# Patient Record
Sex: MALE | Race: WHITE | NOT HISPANIC OR LATINO
[De-identification: names, ages, dates, MRNs, and addresses within clinical notes are randomized per-mention and may not be internally consistent; named-entity substitution may affect disease eponyms.]

---

## 2021-04-05 PROBLEM — Z00.00 ENCOUNTER FOR PREVENTIVE HEALTH EXAMINATION: Status: ACTIVE | Noted: 2021-04-05

## 2021-04-06 ENCOUNTER — APPOINTMENT (OUTPATIENT)
Dept: UROLOGY | Facility: CLINIC | Age: 61
End: 2021-04-06
Payer: COMMERCIAL

## 2021-04-06 VITALS
HEART RATE: 76 BPM | RESPIRATION RATE: 17 BRPM | BODY MASS INDEX: 34.4 KG/M2 | SYSTOLIC BLOOD PRESSURE: 146 MMHG | DIASTOLIC BLOOD PRESSURE: 78 MMHG | WEIGHT: 227 LBS | TEMPERATURE: 98.1 F | HEIGHT: 68 IN

## 2021-04-06 DIAGNOSIS — Z78.9 OTHER SPECIFIED HEALTH STATUS: ICD-10-CM

## 2021-04-06 PROCEDURE — 99203 OFFICE O/P NEW LOW 30 MIN: CPT

## 2021-04-06 PROCEDURE — 99072 ADDL SUPL MATRL&STAF TM PHE: CPT

## 2021-04-06 NOTE — REVIEW OF SYSTEMS
[Negative] : Heme/Lymph [Loss of interest] : loss of interest in sexual activity [No erections] : no erections [Fever] : no fever [Eye Pain] : no eye pain [Earache] : no earache [Chest Pain] : no chest pain [Cough] : no cough [Abdominal Pain] : no abdominal pain [Dysuria] : no dysuria [Arthralgias] : no arthralgias [Skin Wound] : no skin wound [Confused] : no confusion [Suicidal] : not suicidal [Proptosis] : no proptosis [Easy Bleeding] : no tendency for easy bleeding

## 2021-04-06 NOTE — REVIEW OF SYSTEMS
[Negative] : Heme/Lymph [Loss of interest] : loss of interest in sexual activity [No erections] : no erections [Fever] : no fever [Eye Pain] : no eye pain [Earache] : no earache [Chest Pain] : no chest pain [Cough] : no cough [Abdominal Pain] : no abdominal pain [Dysuria] : no dysuria [Arthralgias] : no arthralgias [Confused] : no confusion [Skin Wound] : no skin wound [Suicidal] : not suicidal [Proptosis] : no proptosis [Easy Bleeding] : no tendency for easy bleeding

## 2021-04-06 NOTE — HISTORY OF PRESENT ILLNESS
[FreeTextEntry1] : 60 yr male sent by PMD  Lionel Fletcher for PSA elevation 4.36 .  Has not been to urology in about 5 year. No LUTS as detailed below. .  No blood in urine.  \par Noted PSA 4.4 2019 on review of HIE. no prior PNb \par  [Urinary Incontinence] : no urinary incontinence [Urinary Retention] : no urinary retention [Urinary Urgency] : no urinary urgency [Urinary Frequency] : no urinary frequency [Nocturia] : no nocturia [Straining] : no straining [Weak Stream] : no weak stream [Erectile Dysfunction] : no Erectile Dysfunction [Hematuria - Gross] : no gross hematuria

## 2021-04-06 NOTE — ASSESSMENT
[FreeTextEntry1] : 60 yr male present PSA elevation  - onlky slightly elevated and stable fro past 2 years.\par will check a free and totla PSA - only pursue further work up if has %free PSA ,10%

## 2021-04-06 NOTE — PHYSICAL EXAM
[General Appearance - Well Developed] : well developed [General Appearance - Well Nourished] : well nourished [Normal Appearance] : normal appearance [Well Groomed] : well groomed [General Appearance - In No Acute Distress] : no acute distress [Edema] : no peripheral edema [Abdomen Tenderness] : non-tender [Skin Color & Pigmentation] : normal skin color and pigmentation [No Focal Deficits] : no focal deficits [Oriented To Time, Place, And Person] : oriented to person, place, and time [Abdomen Mass (___ Cm)] : no abdominal mass palpated [Abdomen Hernia] : no hernia was discovered [Size ___ (gms)] : size was estimated to be [unfilled] g [Prostate Hard Area Or Nodule Bilaterally] : had no palpable nodules [Rectal Exam - Prostate] : was not indurated [Nl Sphincter Tone] : normal sphincter tone [No Lesions] : no lesions [Meatal Stenosis] : no meatal stenosis [Circumcised] : the penis was circumcised [Normal] : normal [Scrotum Hydrocele On The Right] : no hydrocele [Scrotum Hydrocele On The Left] : no hydrocele [Testes] : normal [Epididymis] : was normal [Vas Deferens / Spermatic Cord] : was normal [Normal Station and Gait] : the gait and station were normal for the patient's age [] : no rash [Inguinal Lymph Nodes Enlarged Bilaterally] : inguinal

## 2021-04-07 LAB
APPEARANCE: CLEAR
BACTERIA: NEGATIVE
BILIRUBIN URINE: NEGATIVE
BLOOD URINE: NEGATIVE
COLOR: NORMAL
GLUCOSE QUALITATIVE U: NEGATIVE
HYALINE CASTS: 0 /LPF
KETONES URINE: NEGATIVE
LEUKOCYTE ESTERASE URINE: NEGATIVE
MICROSCOPIC-UA: NORMAL
NITRITE URINE: NEGATIVE
PH URINE: 6.5
PROTEIN URINE: NEGATIVE
PSA FREE FLD-MCNC: 24 %
PSA FREE SERPL-MCNC: 1.09 NG/ML
PSA SERPL-MCNC: 4.55 NG/ML
RED BLOOD CELLS URINE: 0 /HPF
SPECIFIC GRAVITY URINE: 1
SQUAMOUS EPITHELIAL CELLS: 0 /HPF
UROBILINOGEN URINE: NORMAL
WHITE BLOOD CELLS URINE: 0 /HPF

## 2022-04-19 ENCOUNTER — APPOINTMENT (OUTPATIENT)
Dept: UROLOGY | Facility: CLINIC | Age: 62
End: 2022-04-19
Payer: COMMERCIAL

## 2022-04-19 VITALS
DIASTOLIC BLOOD PRESSURE: 84 MMHG | BODY MASS INDEX: 31.84 KG/M2 | HEIGHT: 69 IN | OXYGEN SATURATION: 97 % | TEMPERATURE: 98.5 F | SYSTOLIC BLOOD PRESSURE: 148 MMHG | HEART RATE: 68 BPM | WEIGHT: 215 LBS

## 2022-04-19 DIAGNOSIS — Z63.5 DISRUPTION OF FAMILY BY SEPARATION AND DIVORCE: ICD-10-CM

## 2022-04-19 DIAGNOSIS — Z80.42 FAMILY HISTORY OF MALIGNANT NEOPLASM OF PROSTATE: ICD-10-CM

## 2022-04-19 DIAGNOSIS — Z80.9 FAMILY HISTORY OF MALIGNANT NEOPLASM, UNSPECIFIED: ICD-10-CM

## 2022-04-19 DIAGNOSIS — N40.1 BENIGN PROSTATIC HYPERPLASIA WITH LOWER URINARY TRACT SYMPMS: ICD-10-CM

## 2022-04-19 PROCEDURE — 99215 OFFICE O/P EST HI 40 MIN: CPT

## 2022-04-19 SDOH — SOCIAL STABILITY - SOCIAL INSECURITY: DISRUPTION OF FAMILY BY SEPARATION AND DIVORCE: Z63.5

## 2022-04-19 NOTE — PHYSICAL EXAM
[General Appearance - Well Developed] : well developed [General Appearance - Well Nourished] : well nourished [Normal Appearance] : normal appearance [Well Groomed] : well groomed [General Appearance - In No Acute Distress] : no acute distress [Edema] : no peripheral edema [Respiration, Rhythm And Depth] : normal respiratory rhythm and effort [Exaggerated Use Of Accessory Muscles For Inspiration] : no accessory muscle use [Abdomen Soft] : soft [Abdomen Tenderness] : non-tender [Costovertebral Angle Tenderness] : no ~M costovertebral angle tenderness [Urethral Meatus] : meatus normal [Penis Abnormality] : normal circumcised penis [Urinary Bladder Findings] : the bladder was normal on palpation [Scrotum] : the scrotum was normal [Epididymis] : the epididymides were normal [Testes Tenderness] : no tenderness of the testes [Testes Mass (___cm)] : there were no testicular masses [Anus Abnormality] : the anus and perineum were normal [Prostate Tenderness] : the prostate was not tender [Rectal Exam - Rectum] : digital rectal exam was normal [No Prostate Nodules] : no prostate nodules [Prostate Size ___ (0-4)] : prostate size [unfilled] (scale: 0-4) [Normal Station and Gait] : the gait and station were normal for the patient's age [] : no rash [No Focal Deficits] : no focal deficits [Oriented To Time, Place, And Person] : oriented to person, place, and time [Affect] : the affect was normal [Mood] : the mood was normal [Not Anxious] : not anxious [No Palpable Adenopathy] : no palpable adenopathy

## 2022-04-20 PROBLEM — N40.1 ENLARGED PROSTATE WITH LOWER URINARY TRACT SYMPTOMS (LUTS): Status: ACTIVE | Noted: 2022-04-20

## 2022-04-20 LAB — PSA SERPL-MCNC: 10.2 NG/ML

## 2022-04-20 NOTE — LETTER BODY
[Dear  ___] : Dear  [unfilled], [Consult Letter:] : I had the pleasure of evaluating your patient, [unfilled]. [Please see my note below.] : Please see my note below. [Consult Closing:] : Thank you very much for allowing me to participate in the care of this patient.  If you have any questions, please do not hesitate to contact me. [Sincerely,] : Sincerely, [FreeTextEntry3] : Abelino Delgado MD\par

## 2022-04-20 NOTE — HISTORY OF PRESENT ILLNESS
[FreeTextEntry1] : Sent by Dr Fletcher for  rising PSA \par \par 04/01/2022: 7.40 ng/ml\par 04/07/2021: 4.55 ng/ml\par                      4.36 ng/ml\par \par no urinary symptoms. As per Dr Bermudez's note PSA was stable for last 2 years in 04/2021.\par Will repeat PSA today. \par \par ED: , Good libido, gets erections are not hard enough.

## 2022-04-20 NOTE — REVIEW OF SYSTEMS
[Poor quality erections] : Poor quality erections [Wake up at night to urinate  How many times?  ___] : wakes up to urinate [unfilled] times during the night [Negative] : Heme/Lymph [see HPI] : see HPI [FreeTextEntry1] : not enough rest  [FreeTextEntry4] : interested in treatment

## 2022-05-26 ENCOUNTER — APPOINTMENT (OUTPATIENT)
Dept: UROLOGY | Facility: CLINIC | Age: 62
End: 2022-05-26
Payer: COMMERCIAL

## 2022-05-26 VITALS
TEMPERATURE: 98.1 F | SYSTOLIC BLOOD PRESSURE: 132 MMHG | WEIGHT: 220 LBS | HEART RATE: 63 BPM | HEIGHT: 69 IN | BODY MASS INDEX: 32.58 KG/M2 | DIASTOLIC BLOOD PRESSURE: 81 MMHG | OXYGEN SATURATION: 98 %

## 2022-05-26 PROCEDURE — 99213 OFFICE O/P EST LOW 20 MIN: CPT

## 2022-05-26 RX ORDER — ATORVASTATIN CALCIUM 20 MG/1
20 TABLET, FILM COATED ORAL
Refills: 0 | Status: ACTIVE | COMMUNITY

## 2022-05-26 RX ORDER — CHROMIUM 200 MCG
TABLET ORAL
Refills: 0 | Status: ACTIVE | COMMUNITY

## 2022-05-26 NOTE — REVIEW OF SYSTEMS
[see HPI] : see HPI [Poor quality erections] : Poor quality erections [Wake up at night to urinate  How many times?  ___] : wakes up to urinate [unfilled] times during the night [Negative] : Heme/Lymph [FreeTextEntry1] : not enough rest  [FreeTextEntry4] : interested in treatment

## 2022-05-26 NOTE — HISTORY OF PRESENT ILLNESS
[FreeTextEntry1] : Sent by Dr Fletcher for  rising PSA \par                   4.36 ng/ml\par 04/07/2021: 4.55 ng/ml\par 04/01/2022: 7.40 ng/ml\par \par no urinary symptoms. As per Dr Bermudez's note PSA was stable for last 2 years in 04/2021.\par Will repeat PSA today. \par \par ED: , Good libido, gets erections are not hard enough. \par \par will repeat PSA \par RTC: 1 month\par \par \par 05/26/2022: Rising PSA\par \par                   4.36 ng/ml\par 04/07/2021: 4.55 ng/ml\par 04/01/2022: 7.40 ng/ml\par 04/19/2022: 10.20 ng/ml\par \par CISCO: non nodular prostate. \par Will schedule for MRI of Prostate and repeat PSA \par \par  ED: better with 20 mg. Will increase to 2 tabs\par \par RTC 3 weeks to review MRI finding.\par \par

## 2022-05-31 LAB — PSA SERPL-MCNC: 6.17 NG/ML

## 2022-06-07 ENCOUNTER — OUTPATIENT (OUTPATIENT)
Dept: OUTPATIENT SERVICES | Facility: HOSPITAL | Age: 62
LOS: 1 days | End: 2022-06-07
Payer: COMMERCIAL

## 2022-06-07 ENCOUNTER — APPOINTMENT (OUTPATIENT)
Dept: MRI IMAGING | Facility: IMAGING CENTER | Age: 62
End: 2022-06-07
Payer: COMMERCIAL

## 2022-06-07 DIAGNOSIS — R97.20 ELEVATED PROSTATE SPECIFIC ANTIGEN [PSA]: ICD-10-CM

## 2022-06-07 PROCEDURE — A9585: CPT

## 2022-06-07 PROCEDURE — 76498 UNLISTED MR PROCEDURE: CPT

## 2022-06-07 PROCEDURE — 72197 MRI PELVIS W/O & W/DYE: CPT

## 2022-06-07 PROCEDURE — 72197 MRI PELVIS W/O & W/DYE: CPT | Mod: 26

## 2022-06-10 ENCOUNTER — RESULT REVIEW (OUTPATIENT)
Age: 62
End: 2022-06-10

## 2022-06-10 PROCEDURE — 76498P: CUSTOM | Mod: 26

## 2022-06-16 ENCOUNTER — APPOINTMENT (OUTPATIENT)
Dept: UROLOGY | Facility: CLINIC | Age: 62
End: 2022-06-16
Payer: COMMERCIAL

## 2022-06-16 VITALS
DIASTOLIC BLOOD PRESSURE: 77 MMHG | TEMPERATURE: 98 F | OXYGEN SATURATION: 98 % | BODY MASS INDEX: 32.88 KG/M2 | HEART RATE: 69 BPM | WEIGHT: 222 LBS | HEIGHT: 69 IN | SYSTOLIC BLOOD PRESSURE: 128 MMHG

## 2022-06-16 DIAGNOSIS — E78.5 HYPERLIPIDEMIA, UNSPECIFIED: ICD-10-CM

## 2022-06-16 PROCEDURE — 99214 OFFICE O/P EST MOD 30 MIN: CPT

## 2022-06-16 NOTE — HISTORY OF PRESENT ILLNESS
[FreeTextEntry1] : Sent by Dr Fletcher for  rising PSA \par                   4.36 ng/ml\par 04/07/2021: 4.55 ng/ml\par 04/01/2022: 7.40 ng/ml\par \par no urinary symptoms. As per Dr Bermudez's note PSA was stable for last 2 years in 04/2021.\par Will repeat PSA today. \par \par ED: , Good libido, gets erections are not hard enough. \par \par will repeat PSA \par RTC: 1 month\par \par \par 05/26/2022: Rising PSA\par \par                   4.36 ng/ml\par 04/07/2021: 4.55 ng/ml\par 04/01/2022: 7.40 ng/ml\par 04/19/2022: 10.20 ng/ml\par \par CISCO: non nodular prostate. \par Will schedule for MRI of Prostate and repeat PSA \par \par  ED: better with 20 mg. Will increase to 2 tabs\par \par RTC 3 weeks to review MRI finding.\par \par 06/16/2022: Mr. AGUILAR is a 61 year male who presents today for a follow up for rising PSA and MRI results. \par 04/07/2021: 4.55 ng/ml\par 04/01/2022: 7.40 ng/ml\par 04/19/2022: 10.20 ng/ml\par 05/26/2022: 6.17 ng/ml \par \par Will repeat PSA today. \par \par MRI shows lesion in PIRADS 5 lesion.\par \par ED: Sildenafil working well. Renewed today\par \par will schedule for fusion biopsy with Dr Terry\par \par

## 2022-06-18 LAB — PSA SERPL-MCNC: 6.39 NG/ML

## 2022-06-28 ENCOUNTER — NON-APPOINTMENT (OUTPATIENT)
Age: 62
End: 2022-06-28

## 2022-07-01 DIAGNOSIS — Z01.818 ENCOUNTER FOR OTHER PREPROCEDURAL EXAMINATION: ICD-10-CM

## 2022-07-06 ENCOUNTER — APPOINTMENT (OUTPATIENT)
Dept: UROLOGY | Facility: CLINIC | Age: 62
End: 2022-07-06

## 2022-07-06 ENCOUNTER — OUTPATIENT (OUTPATIENT)
Dept: OUTPATIENT SERVICES | Facility: HOSPITAL | Age: 62
LOS: 1 days | End: 2022-07-06
Payer: COMMERCIAL

## 2022-07-06 VITALS
DIASTOLIC BLOOD PRESSURE: 77 MMHG | SYSTOLIC BLOOD PRESSURE: 144 MMHG | RESPIRATION RATE: 17 BRPM | TEMPERATURE: 98.5 F | OXYGEN SATURATION: 99 % | HEART RATE: 72 BPM

## 2022-07-06 DIAGNOSIS — R97.20 ELEVATED PROSTATE, SPECIFIC ANTIGEN [PSA]: ICD-10-CM

## 2022-07-06 DIAGNOSIS — R35.0 FREQUENCY OF MICTURITION: ICD-10-CM

## 2022-07-06 PROCEDURE — 55700: CPT

## 2022-07-06 PROCEDURE — 76872 US TRANSRECTAL: CPT

## 2022-07-06 PROCEDURE — 55700: CPT | Mod: 22

## 2022-07-06 PROCEDURE — 76942 ECHO GUIDE FOR BIOPSY: CPT | Mod: 59

## 2022-07-06 PROCEDURE — 76942 ECHO GUIDE FOR BIOPSY: CPT | Mod: 26,59

## 2022-07-06 PROCEDURE — 76377 3D RENDER W/INTRP POSTPROCES: CPT | Mod: 26

## 2022-07-07 ENCOUNTER — NON-APPOINTMENT (OUTPATIENT)
Age: 62
End: 2022-07-07

## 2022-07-08 ENCOUNTER — NON-APPOINTMENT (OUTPATIENT)
Age: 62
End: 2022-07-08

## 2022-07-09 ENCOUNTER — EMERGENCY (EMERGENCY)
Facility: HOSPITAL | Age: 62
LOS: 1 days | Discharge: HOME | End: 2022-07-09
Attending: EMERGENCY MEDICINE | Admitting: EMERGENCY MEDICINE
Payer: COMMERCIAL

## 2022-07-09 VITALS
TEMPERATURE: 98 F | OXYGEN SATURATION: 97 % | SYSTOLIC BLOOD PRESSURE: 150 MMHG | WEIGHT: 222.01 LBS | HEART RATE: 88 BPM | HEIGHT: 69 IN | DIASTOLIC BLOOD PRESSURE: 93 MMHG | RESPIRATION RATE: 17 BRPM

## 2022-07-09 VITALS — TEMPERATURE: 99 F

## 2022-07-09 LAB
ALBUMIN SERPL ELPH-MCNC: 4.5 G/DL — SIGNIFICANT CHANGE UP (ref 3.3–5)
ALP SERPL-CCNC: 70 U/L — SIGNIFICANT CHANGE UP (ref 40–120)
ALT FLD-CCNC: 20 U/L — SIGNIFICANT CHANGE UP (ref 10–45)
ANION GAP SERPL CALC-SCNC: 12 MMOL/L — SIGNIFICANT CHANGE UP (ref 5–17)
APPEARANCE UR: CLEAR — SIGNIFICANT CHANGE UP
AST SERPL-CCNC: 25 U/L — SIGNIFICANT CHANGE UP (ref 10–40)
BACTERIA # UR AUTO: NEGATIVE — SIGNIFICANT CHANGE UP
BASOPHILS # BLD AUTO: 0.03 K/UL — SIGNIFICANT CHANGE UP (ref 0–0.2)
BASOPHILS NFR BLD AUTO: 0.4 % — SIGNIFICANT CHANGE UP (ref 0–2)
BILIRUB SERPL-MCNC: 0.7 MG/DL — SIGNIFICANT CHANGE UP (ref 0.2–1.2)
BILIRUB UR-MCNC: NEGATIVE — SIGNIFICANT CHANGE UP
BUN SERPL-MCNC: 10 MG/DL — SIGNIFICANT CHANGE UP (ref 7–23)
CALCIUM SERPL-MCNC: 9.3 MG/DL — SIGNIFICANT CHANGE UP (ref 8.4–10.5)
CHLORIDE SERPL-SCNC: 102 MMOL/L — SIGNIFICANT CHANGE UP (ref 96–108)
CO2 SERPL-SCNC: 25 MMOL/L — SIGNIFICANT CHANGE UP (ref 22–31)
COLOR SPEC: COLORLESS — SIGNIFICANT CHANGE UP
CREAT SERPL-MCNC: 0.98 MG/DL — SIGNIFICANT CHANGE UP (ref 0.5–1.3)
DIFF PNL FLD: NEGATIVE — SIGNIFICANT CHANGE UP
EGFR: 88 ML/MIN/1.73M2 — SIGNIFICANT CHANGE UP
EOSINOPHIL # BLD AUTO: 0.06 K/UL — SIGNIFICANT CHANGE UP (ref 0–0.5)
EOSINOPHIL NFR BLD AUTO: 0.7 % — SIGNIFICANT CHANGE UP (ref 0–6)
EPI CELLS # UR: 0 /HPF — SIGNIFICANT CHANGE UP
GLUCOSE SERPL-MCNC: 95 MG/DL — SIGNIFICANT CHANGE UP (ref 70–99)
GLUCOSE UR QL: NEGATIVE — SIGNIFICANT CHANGE UP
HCT VFR BLD CALC: 47.8 % — SIGNIFICANT CHANGE UP (ref 39–50)
HGB BLD-MCNC: 16.1 G/DL — SIGNIFICANT CHANGE UP (ref 13–17)
HYALINE CASTS # UR AUTO: 0 /LPF — SIGNIFICANT CHANGE UP (ref 0–2)
IMM GRANULOCYTES NFR BLD AUTO: 0.4 % — SIGNIFICANT CHANGE UP (ref 0–1.5)
KETONES UR-MCNC: NEGATIVE — SIGNIFICANT CHANGE UP
LEUKOCYTE ESTERASE UR-ACNC: NEGATIVE — SIGNIFICANT CHANGE UP
LYMPHOCYTES # BLD AUTO: 2.11 K/UL — SIGNIFICANT CHANGE UP (ref 1–3.3)
LYMPHOCYTES # BLD AUTO: 25 % — SIGNIFICANT CHANGE UP (ref 13–44)
MAGNESIUM SERPL-MCNC: 2 MG/DL — SIGNIFICANT CHANGE UP (ref 1.6–2.6)
MCHC RBC-ENTMCNC: 29.1 PG — SIGNIFICANT CHANGE UP (ref 27–34)
MCHC RBC-ENTMCNC: 33.7 GM/DL — SIGNIFICANT CHANGE UP (ref 32–36)
MCV RBC AUTO: 86.4 FL — SIGNIFICANT CHANGE UP (ref 80–100)
MONOCYTES # BLD AUTO: 0.75 K/UL — SIGNIFICANT CHANGE UP (ref 0–0.9)
MONOCYTES NFR BLD AUTO: 8.9 % — SIGNIFICANT CHANGE UP (ref 2–14)
NEUTROPHILS # BLD AUTO: 5.46 K/UL — SIGNIFICANT CHANGE UP (ref 1.8–7.4)
NEUTROPHILS NFR BLD AUTO: 64.6 % — SIGNIFICANT CHANGE UP (ref 43–77)
NITRITE UR-MCNC: NEGATIVE — SIGNIFICANT CHANGE UP
NRBC # BLD: 0 /100 WBCS — SIGNIFICANT CHANGE UP (ref 0–0)
PH UR: 6.5 — SIGNIFICANT CHANGE UP (ref 5–8)
PHOSPHATE SERPL-MCNC: 3.1 MG/DL — SIGNIFICANT CHANGE UP (ref 2.5–4.5)
PLATELET # BLD AUTO: 209 K/UL — SIGNIFICANT CHANGE UP (ref 150–400)
POTASSIUM SERPL-MCNC: 4.3 MMOL/L — SIGNIFICANT CHANGE UP (ref 3.5–5.3)
POTASSIUM SERPL-SCNC: 4.3 MMOL/L — SIGNIFICANT CHANGE UP (ref 3.5–5.3)
PROT SERPL-MCNC: 7.8 G/DL — SIGNIFICANT CHANGE UP (ref 6–8.3)
PROT UR-MCNC: NEGATIVE — SIGNIFICANT CHANGE UP
RBC # BLD: 5.53 M/UL — SIGNIFICANT CHANGE UP (ref 4.2–5.8)
RBC # FLD: 13.1 % — SIGNIFICANT CHANGE UP (ref 10.3–14.5)
RBC CASTS # UR COMP ASSIST: 0 /HPF — SIGNIFICANT CHANGE UP (ref 0–4)
SODIUM SERPL-SCNC: 139 MMOL/L — SIGNIFICANT CHANGE UP (ref 135–145)
SP GR SPEC: 1.01 — LOW (ref 1.01–1.02)
UROBILINOGEN FLD QL: NEGATIVE — SIGNIFICANT CHANGE UP
WBC # BLD: 8.44 K/UL — SIGNIFICANT CHANGE UP (ref 3.8–10.5)
WBC # FLD AUTO: 8.44 K/UL — SIGNIFICANT CHANGE UP (ref 3.8–10.5)
WBC UR QL: 0 /HPF — SIGNIFICANT CHANGE UP (ref 0–5)

## 2022-07-09 PROCEDURE — 36415 COLL VENOUS BLD VENIPUNCTURE: CPT

## 2022-07-09 PROCEDURE — 87086 URINE CULTURE/COLONY COUNT: CPT

## 2022-07-09 PROCEDURE — 99283 EMERGENCY DEPT VISIT LOW MDM: CPT

## 2022-07-09 PROCEDURE — 81001 URINALYSIS AUTO W/SCOPE: CPT

## 2022-07-09 PROCEDURE — 84100 ASSAY OF PHOSPHORUS: CPT

## 2022-07-09 PROCEDURE — 99284 EMERGENCY DEPT VISIT MOD MDM: CPT

## 2022-07-09 PROCEDURE — 85025 COMPLETE CBC W/AUTO DIFF WBC: CPT

## 2022-07-09 PROCEDURE — 80053 COMPREHEN METABOLIC PANEL: CPT

## 2022-07-09 PROCEDURE — 83735 ASSAY OF MAGNESIUM: CPT

## 2022-07-09 NOTE — ED ADULT TRIAGE NOTE - CHIEF COMPLAINT QUOTE
had prostate biopsy on wednesday, now having not a steady stream of urine and increased frequency, no pain, no burning,

## 2022-07-09 NOTE — ED PROVIDER NOTE - NSFOLLOWUPINSTRUCTIONS_ED_ALL_ED_FT
You arrived with a weak urinary stream. We did basic blood work and analyzed your urine to ensure you did not develop an infection after your biopsy. We did not find any evidence an infection. If you develop pain with urination, bloody urine, high fever, flank pain, or inability to urinate, please return to the emergency room. Please continue to follow with your primary care doctor and your urologist.

## 2022-07-09 NOTE — ED PROVIDER NOTE - CARE PROVIDERS DIRECT ADDRESSES
[email or web address]

## 2022-07-09 NOTE — ED PROVIDER NOTE - PATIENT PORTAL LINK FT
You can access the FollowMyHealth Patient Portal offered by [facility] by registering at the following website: [email or web address]. By joining adjust’s FollowMyHealth portal, you will also be able to view your health information using other applications (apps) compatible with our system.

## 2022-07-09 NOTE — ED PROVIDER NOTE - PHYSICAL EXAMINATION
LOS:     VITALS:   T(C): 36.8 (07-09-22 @ 10:33), Max: 36.8 (07-09-22 @ 10:33)  HR: 88 (07-09-22 @ 10:33) (88 - 88)  BP: 150/93 (07-09-22 @ 10:33) (150/93 - 150/93)  RR: 17 (07-09-22 @ 10:33) (17 - 17)  SpO2: 97% (07-09-22 @ 10:33) (97% - 97%)    GENERAL: NAD, sitting in bed comfortably  HEAD:  Atraumatic, Normocephalic  EYES: EOMI, PERRLA, conjunctiva and sclera clear  ENT: Moist mucous membranes  NECK: Supple, No JVD  CHEST/LUNG: Clear to auscultation bilaterally; No rales, rhonchi, wheezing, or rubs. Unlabored respirations  HEART: Regular rate and rhythm; No murmurs, rubs, or gallops  ABDOMEN: Soft, nontender, nondistended  EXTREMITIES:  2+ Peripheral Pulses, brisk capillary refill. No clubbing, cyanosis, or edema  NERVOUS SYSTEM:  A&Ox3, no focal deficits   SKIN: No rashes or lesions

## 2022-07-09 NOTE — ED PROVIDER NOTE - CLINICAL SUMMARY MEDICAL DECISION MAKING FREE TEXT BOX
61 yo man with history of HLD and BPH presents today after "urinary dribbling" this morning at 3am, patient became nervous so presented to ED. Had prostate biopsy this past Wednesday (has PIRAD-5 prostate lesion). Denies frequency, urgency, flank pain, pain at biopsy site. His urinary stream has since improved, was able to void normally on arrival to ED. Denies fever, CP, SOB. Will order bladder scan to r/o retention, basic blood work UA and UCx to evaluate for post-biopsy prostatitis, and re-assess. 63 yo man with history of HLD and BPH presents today after "urinary dribbling" this morning at 3am, patient became nervous so presented to ED. Had prostate biopsy this past Wednesday (has PIRAD-5 prostate lesion). Denies frequency, urgency, flank pain, pain at biopsy site. His urinary stream has since improved, was able to void normally on arrival to ED. Denies fever, CP, SOB. Will order bladder scan to r/o retention, basic blood work UA and UCx to evaluate for post-biopsy prostatitis  cons , and re-assess.ZR 63 yo man with history of HLD and BPH presents today after "urinary dribbling" this morning at 3am, patient became nervous so presented to ED. Had prostate biopsy this past Wednesday (has PIRAD-5 prostate lesion). Denies frequency, urgency, flank pain, pain at biopsy site. His urinary stream has since improved, was able to void normally on arrival to ED. Denies fever, CP, SOB. Will order bladder scan to r/o retention, basic blood work UA and UCx to evaluate for post-biopsy prostatitis  cons , and re-assess.ZR    EK - all studies negative. Will discharge with  and PCP follow up. 63 yo man with history of HLD and BPH presents today after "urinary dribbling" this morning at 3am, patient became nervous so presented to ED. Had prostate biopsy this past Wednesday (has PIRAD-5 prostate lesion). Denies frequency, urgency, flank pain, pain at biopsy site. His urinary stream has since improved, was able to void normally on arrival to ED. Denies fever, CP, SOB. Will order bladder scan to r/o retention, basic blood work UA and UCx to evaluate for post-biopsy prostatitis  cons , and re-assess.ZR    EK - all studies negative. No UTI. No fever or WBC count. No urinary retention on bladder scan. Will discharge with  and PCP follow up.

## 2022-07-09 NOTE — ED PROVIDER NOTE - CARE PROVIDER_API CALL
Lionel Fletcher  CARDIOVASCULAR DISEASE  [address] [place] [address]  Phone: [phone]  Fax: [phone]  Follow Up Time:     Aaron Terry)  Urology  [address] [place] [address], [place] [address]  Phone: [phone]  Fax: [phone]  Follow Up Time:

## 2022-07-09 NOTE — ED PROVIDER NOTE - NS ED ROS FT
REVIEW OF SYSTEMS:    CONSTITUTIONAL: No weakness, fevers or chills  EYES: No visual changes; no sclera icterics, no pain or drainage  ENT:  No vertigo or throat pain   NECK: No pain or stiffness  RESPIRATORY: No cough, wheezing, hemoptysis; No shortness of breath  CARDIOVASCULAR: No chest pain or palpitations  GASTROINTESTINAL: No abdominal or epigastric pain. No nausea, vomiting, or hematemesis; No diarrhea or constipation. No melena or hematochezia.  GENITOURINARY: No dysuria, frequency   NEUROLOGICAL: No numbness or weakness  SKIN: No itching, rashes  Psych: No anxiety or depression

## 2022-07-09 NOTE — ED PROVIDER NOTE - OBJECTIVE STATEMENT
61 yo man with history of HLD and BPH presents today after "urinary dribbling" this morning at 3am, patient became nervous so presented to ED. Had prostate biopsy this past Wednesday (has PIRAD-5 prostate lesion). Denies frequency, urgency, flank pain, pain at biopsy site. His urinary stream has since improved, was able to void normally on arrival to ED. Denies fever, CP, SOB.

## 2022-07-10 LAB
CULTURE RESULTS: NO GROWTH — SIGNIFICANT CHANGE UP
SPECIMEN SOURCE: SIGNIFICANT CHANGE UP

## 2022-07-13 LAB — CORE LAB BIOPSY: NORMAL

## 2022-07-13 RX ORDER — DIAZEPAM 5 MG/1
5 TABLET ORAL
Qty: 1 | Refills: 0 | Status: COMPLETED | COMMUNITY
Start: 2022-07-01 | End: 2022-07-13

## 2022-07-18 DIAGNOSIS — R97.20 ELEVATED PROSTATE SPECIFIC ANTIGEN [PSA]: ICD-10-CM

## 2022-07-21 ENCOUNTER — APPOINTMENT (OUTPATIENT)
Dept: UROLOGY | Facility: CLINIC | Age: 62
End: 2022-07-21

## 2022-07-21 VITALS
TEMPERATURE: 98.2 F | HEART RATE: 83 BPM | DIASTOLIC BLOOD PRESSURE: 82 MMHG | BODY MASS INDEX: 32.88 KG/M2 | RESPIRATION RATE: 16 BRPM | WEIGHT: 222 LBS | HEIGHT: 69 IN | SYSTOLIC BLOOD PRESSURE: 137 MMHG

## 2022-07-21 DIAGNOSIS — N52.9 MALE ERECTILE DYSFUNCTION, UNSPECIFIED: ICD-10-CM

## 2022-07-21 DIAGNOSIS — R97.20 ELEVATED PROSTATE, SPECIFIC ANTIGEN [PSA]: ICD-10-CM

## 2022-07-21 PROCEDURE — 99214 OFFICE O/P EST MOD 30 MIN: CPT

## 2022-07-21 RX ORDER — HYDROCORTISONE 25 MG/G
2.5 CREAM TOPICAL
Qty: 28 | Refills: 0 | Status: DISCONTINUED | COMMUNITY
Start: 2022-07-19

## 2022-07-21 RX ORDER — AMOXICILLIN 500 MG/1
500 CAPSULE ORAL
Qty: 22 | Refills: 0 | Status: DISCONTINUED | COMMUNITY
Start: 2022-06-01

## 2022-07-21 RX ORDER — AMOXICILLIN AND CLAVULANATE POTASSIUM 500; 125 MG/1; MG/1
500-125 TABLET, FILM COATED ORAL
Qty: 21 | Refills: 0 | Status: DISCONTINUED | COMMUNITY
Start: 2022-06-13

## 2022-07-21 RX ORDER — METRONIDAZOLE 500 MG/1
500 TABLET ORAL
Qty: 21 | Refills: 0 | Status: DISCONTINUED | COMMUNITY
Start: 2022-06-13

## 2022-07-21 NOTE — HISTORY OF PRESENT ILLNESS
[FreeTextEntry1] : Here for prostate biopsy results discussion.\par \par Reports that stream is good, No significant urgency or frequency. \par Nocturia x 1.  \par \par Using Sildenafil 50 mg- 100 mg and having good quality erections.

## 2022-07-21 NOTE — VITALS
[100: Fully active, normal.] : 100: Fully active, normal. [ECOG Performance Status: 0 - Fully active, able to carry on all pre-disease performance without restriction] : Performance Status: 0 - Fully active, able to carry on all pre-disease performance without restriction

## 2022-07-21 NOTE — DISEASE MANAGEMENT
[1] : T1 [c] : c [0-10] : 0 -10 ng/mL [Biopsy with Fusion] : Patient had a biopsy with fusion on [7(3+4)] : Template Biopsy [place] Score: 7(3+4) [Biopsy results sent to PCP/Referring Physician] : Biopsy results sent to PCP/Referring Physician [5] : 5 [IIB] : IIB [0] : N0 [X] : MX [] : Patient had a Prostate MRI [BiopsyDate] : 07/07/2022 [MeasuredProstateVolume] : 39 [TotalCores] : 13 [TotalPositiveCores] : 7 [MaxCoreInvolvement] : 95 [FreeTextEntry7] : MRI prostate: 39 mL volume, PIRAD 5 lesion, bilateral capsule abuttment.

## 2022-07-21 NOTE — DISEASE MANAGEMENT
[1] : T1 [c] : c [0-10] : 0 -10 ng/mL [Biopsy with Fusion] : Patient had a biopsy with fusion on [7(3+4)] : Template Biopsy [place] Score: 7(3+4) [Biopsy results sent to PCP/Referring Physician] : Biopsy results sent to PCP/Referring Physician [5] : 5 [IIB] : IIB [0] : N0 [X] : MX [] : Patient had a Prostate MRI [BiopsyDate] : 07/07/2022 [MeasuredProstateVolume] : 39 [TotalCores] : 13 [TotalPositiveCores] : 7 [MaxCoreInvolvement] : 95 [FreeTextEntry7] : MRI prostate: 39 mL volume, PIRAD 5 lesion, bilateral capsule abuttment.

## 2022-07-28 ENCOUNTER — APPOINTMENT (OUTPATIENT)
Dept: RADIATION ONCOLOGY | Facility: CLINIC | Age: 62
End: 2022-07-28

## 2022-07-28 VITALS
HEART RATE: 75 BPM | RESPIRATION RATE: 16 BRPM | DIASTOLIC BLOOD PRESSURE: 78 MMHG | HEIGHT: 69 IN | SYSTOLIC BLOOD PRESSURE: 165 MMHG | BODY MASS INDEX: 32.58 KG/M2 | TEMPERATURE: 97 F | OXYGEN SATURATION: 98 % | WEIGHT: 220 LBS

## 2022-07-28 PROCEDURE — 99204 OFFICE O/P NEW MOD 45 MIN: CPT | Mod: GC

## 2022-07-28 RX ORDER — PSYLLIUM HUSK 0.4 G
CAPSULE ORAL
Refills: 0 | Status: DISCONTINUED | COMMUNITY
End: 2022-07-28

## 2022-07-29 ENCOUNTER — OUTPATIENT (OUTPATIENT)
Dept: OUTPATIENT SERVICES | Facility: HOSPITAL | Age: 62
LOS: 1 days | Discharge: HOME | End: 2022-07-29

## 2022-07-29 PROCEDURE — 77263 THER RADIOLOGY TX PLNG CPLX: CPT

## 2022-07-30 NOTE — DISEASE MANAGEMENT
[1] : T1 [c] : c [0] : M0 [0-10] : 0 -10 ng/mL [Biopsy with Fusion] : Patient had a biopsy with fusion on [7(3+4)] : Template Biopsy [place] Score: 7(3+4) [] : Patient had a Prostate MRI [5] : 5 [IIB] : IIB [BiopsyDate] : 07/22 [MeasuredProstateVolume] : 39 [TotalCores] : 13 [TotalPositiveCores] : 7 [MaxCoreInvolvement] : 95

## 2022-07-30 NOTE — REVIEW OF SYSTEMS
[Anxiety] : anxiety [Negative] : Allergic/Immunologic [Eye Pain] : no eye pain [Visual Disturbances] : no visual disturbances [Dysphagia] : no dysphagia [Nosebleeds] : no nosebleeds [Chest Pain] : no chest pain [Palpitations] : no palpitations [Lower Ext Edema] : no lower extremity edema [Shortness Of Breath] : no shortness of breath [Cough] : no cough [SOB on Exertion] : no shortness of breath during exertion [Nocturia] : no nocturia [Urinary Frequency] : no urinary frequency [Joint Pain] : no joint pain [Disturbance Of Gait] : no gait disturbance [Confused] : no confusion [Dizziness] : no dizziness [FreeTextEntry7] :  under tx rectal fissure with colorectal MD

## 2022-07-30 NOTE — END OF VISIT
[] : Resident [FreeTextEntry3] : I saw and examined this patient on the date of service with my assigned resident physician, Dr. Wilbur Singh. I was involved in all procedures and radiographic assessments. I personally confirmed pertinent history and exam findings and reviewed the patient's diagnosis and plan with them.\par \par 62M w/ prostate cancer, UIR rcT2c G7(3+4) iPSA 6.39 6/16/22. MRI prostate 6/10/22 showed 4 x 4 x 4.6 cm = 39 cc prostate (4.6 SI x 4.4 LAT by 3.5 AP = 37 cc on my review), with dominant lesion in right and left posterior medial PZ base to mid (3.2 cm, TX-5), with capsule irregularity (no clear NEETA on my review); b/l NVB abutment, no clear SVI or LAD. Biopsy on 7/6/22 showed benign findings in the target lesion; G7(3+4) disease in the left posterior medial apex (1/1, 40%, +PNI) and G6(3+3) disease in 6 other sites bilaterally, 7/13 sites positive. \par \par Based on his findings I would not recommend active surveillance; he is a good candidate for any active therapy, and we discussed his surgical and radiation options and the risks and benefits of the various approaches. He is leaning towards a radiation approach and I would recommend either SBRT or brachytherapy. He would like to pursue brachytherapy and informed consent for this was obtained. He will see his colorectal surgeon first for colonoscopy and assessment of his anal fissure; we may refer him internally if he wants to be seen sooner.

## 2022-07-30 NOTE — PHYSICAL EXAM
[Normal] : well developed, well nourished, in no acute distress [Sclera] : the sclera and conjunctiva were normal [Outer Ear] : the ears and nose were normal in appearance [] : no respiratory distress [Respiration, Rhythm And Depth] : normal respiratory rhythm and effort [Exaggerated Use Of Accessory Muscles For Inspiration] : no accessory muscle use [Abdomen Soft] : soft [Skin Color & Pigmentation] : normal skin color and pigmentation [Oriented To Time, Place, And Person] : oriented to person, place, and time [Arterial Pulses Normal] : the arterial pulses were normal [Edema] : no peripheral edema present [Nondistended] : nondistended [Nail Clubbing] : no clubbing  or cyanosis of the fingernails [No Focal Deficits] : no focal deficits [de-identified] : anxious

## 2022-07-30 NOTE — HISTORY OF PRESENT ILLNESS
[FreeTextEntry1] : Mr. Storm is a 62 year old man recently diagnosed with unfavorable intermediate risk prostate cancer. His PSA increased from 4.55ng/ml on 4/6/2021 to 10.20 on 4/19/22. Since then:\par \par 5/26/22: 6.17\par 6/16/22: 6.39\par \par He had a multiparametric prostate MRI on 6/10/22 which showed a 4 x 4 x 4.6cm = 39cc gland and a b/l peripheral zone lesion with capsular irregularity and neurovascular abutment without sam NEETA; no SVI or LAD. \par \par On his biopsy on 7/6/22, 7/13 cores were positive with highest Nav[place] score 3+4=7, the remainder Nav[place]ota 6. He thus meets technical criteria for unfavorable intermediate risk prostate cancer.\par \par He takes tadalafil. \par \par Today: No major urinary issues; he is able to achieve erections although is not active with a partner currently. He does have discomfort from anal fissure/hemorrhoids and is using a steroid cream for this. He follows with a colorectal surgeon and a colonoscopy is planned. \par IPSS/EPIC Score 6/18\par \par \par

## 2022-07-30 NOTE — VITALS
[Maximal Pain Intensity: 0/10] : 0/10 [Least Pain Intensity: 0/10] : 0/10 [90: Able to carry normal activity; minor signs or symptoms of disease.] : 90: Able to carry normal activity; minor signs or symptoms of disease.  [ECOG Performance Status: 0 - Fully active, able to carry on all pre-disease performance without restriction] : Performance Status: 0 - Fully active, able to carry on all pre-disease performance without restriction [7 - Distress Level] : Distress Level: 7 [FreeTextEntry7] : declined SW referral

## 2022-08-01 ENCOUNTER — NON-APPOINTMENT (OUTPATIENT)
Age: 62
End: 2022-08-01

## 2022-08-08 ENCOUNTER — RX RENEWAL (OUTPATIENT)
Age: 62
End: 2022-08-08

## 2022-08-09 ENCOUNTER — APPOINTMENT (OUTPATIENT)
Dept: COLORECTAL SURGERY | Facility: CLINIC | Age: 62
End: 2022-08-09

## 2022-08-09 VITALS
HEIGHT: 69 IN | RESPIRATION RATE: 16 BRPM | OXYGEN SATURATION: 99 % | SYSTOLIC BLOOD PRESSURE: 126 MMHG | HEART RATE: 68 BPM | DIASTOLIC BLOOD PRESSURE: 79 MMHG | TEMPERATURE: 97.8 F | BODY MASS INDEX: 32.14 KG/M2 | WEIGHT: 217 LBS

## 2022-08-09 DIAGNOSIS — K62.89 OTHER SPECIFIED DISEASES OF ANUS AND RECTUM: ICD-10-CM

## 2022-08-09 DIAGNOSIS — K62.5 HEMORRHAGE OF ANUS AND RECTUM: ICD-10-CM

## 2022-08-09 DIAGNOSIS — K60.2 ANAL FISSURE, UNSPECIFIED: ICD-10-CM

## 2022-08-09 DIAGNOSIS — Z78.9 OTHER SPECIFIED HEALTH STATUS: ICD-10-CM

## 2022-08-09 PROCEDURE — 99203 OFFICE O/P NEW LOW 30 MIN: CPT

## 2022-08-09 RX ORDER — TURMERIC ROOT EXTRACT 500 MG
TABLET ORAL
Refills: 0 | Status: DISCONTINUED | COMMUNITY
End: 2022-08-09

## 2022-08-09 NOTE — ASSESSMENT
[FreeTextEntry1] : Anal fissure with newly diagnosed prostate cancer\par -The patient scheduled for initiation of radiation for newly diagnosed prostate cancer.\par -Colonoscopy prior to radiation\par -Patient with recently diagnosed anal fissure currently on diltiazem ointment and Metamucil daily. Patient reports improvement. Exam deferred as patient will be undergoing colonoscopy and will perform full exam at that time\par -Advise patient to continue medications as described\par -Sitz baths as needed\par -Bowel prep given for colonoscopy\par -Risks and benefits reviewed

## 2022-08-09 NOTE — CONSULT LETTER
[Dear  ___] : Dear  [unfilled], [Consult Letter:] : I had the pleasure of evaluating your patient, [unfilled]. [Please see my note below.] : Please see my note below. [Consult Closing:] : Thank you very much for allowing me to participate in the care of this patient.  If you have any questions, please do not hesitate to contact me. [Sincerely,] : Sincerely, [FreeTextEntry2] : Allen Garrido [FreeTextEntry3] : Sebastien Kearney MD FACS\par Chief Colon and Rectal Surgery\par [facility]

## 2022-08-09 NOTE — PHYSICAL EXAM
[Normal Breath Sounds] : Normal breath sounds [Normal Heart Sounds] : normal heart sounds [Normal Rate and Rhythm] : normal rate and rhythm [Alert] : alert [Oriented to Person] : oriented to person [Oriented to Place] : oriented to place [Oriented to Time] : oriented to time [Calm] : calm [de-identified] : round soft +BS NT/ND [de-identified] : well nourished male [de-identified] : NC/AT [de-identified] : +ROM [de-identified] : intact

## 2022-08-09 NOTE — HISTORY OF PRESENT ILLNESS
[FreeTextEntry1] : 61yo M pt presents for NCA to make colonoscopy appointment prior to radiation treatment for prostate cancer.\par Also c/o sharp anal pain with BM for about 1 month, occasional bright red blood upon wiping, rectal lump inside anus.\par pt went to colorectal doctor and was rx'd diltiazem/lidocaine topical cream, says it has gotten better with using this.\par \par Denies prolapsing rectal tissue, abd pain, nausea, vomiting, SOB, chest pain, kidney issues, or other health issues.\par Last time pt had colonoscopy was about 15 yr ago, nl study.

## 2022-08-16 LAB — SARS-COV-2 N GENE NPH QL NAA+PROBE: DETECTED

## 2022-08-29 ENCOUNTER — OUTPATIENT (OUTPATIENT)
Dept: OUTPATIENT SERVICES | Facility: HOSPITAL | Age: 62
LOS: 1 days | End: 2022-08-29

## 2022-08-29 VITALS
RESPIRATION RATE: 14 BRPM | TEMPERATURE: 97 F | WEIGHT: 216.05 LBS | DIASTOLIC BLOOD PRESSURE: 74 MMHG | SYSTOLIC BLOOD PRESSURE: 130 MMHG | HEART RATE: 65 BPM | OXYGEN SATURATION: 97 % | HEIGHT: 67 IN

## 2022-08-29 DIAGNOSIS — C61 MALIGNANT NEOPLASM OF PROSTATE: ICD-10-CM

## 2022-08-29 DIAGNOSIS — R94.31 ABNORMAL ELECTROCARDIOGRAM [ECG] [EKG]: ICD-10-CM

## 2022-08-29 DIAGNOSIS — Z98.890 OTHER SPECIFIED POSTPROCEDURAL STATES: Chronic | ICD-10-CM

## 2022-08-29 LAB
ALBUMIN SERPL ELPH-MCNC: 4.4 G/DL — SIGNIFICANT CHANGE UP (ref 3.3–5)
ALP SERPL-CCNC: 70 U/L — SIGNIFICANT CHANGE UP (ref 40–120)
ALT FLD-CCNC: 22 U/L — SIGNIFICANT CHANGE UP (ref 4–41)
ANION GAP SERPL CALC-SCNC: 12 MMOL/L — SIGNIFICANT CHANGE UP (ref 7–14)
AST SERPL-CCNC: 21 U/L — SIGNIFICANT CHANGE UP (ref 4–40)
BILIRUB SERPL-MCNC: 0.9 MG/DL — SIGNIFICANT CHANGE UP (ref 0.2–1.2)
BUN SERPL-MCNC: 11 MG/DL — SIGNIFICANT CHANGE UP (ref 7–23)
CALCIUM SERPL-MCNC: 9.3 MG/DL — SIGNIFICANT CHANGE UP (ref 8.4–10.5)
CHLORIDE SERPL-SCNC: 101 MMOL/L — SIGNIFICANT CHANGE UP (ref 98–107)
CO2 SERPL-SCNC: 25 MMOL/L — SIGNIFICANT CHANGE UP (ref 22–31)
CREAT SERPL-MCNC: 1.14 MG/DL — SIGNIFICANT CHANGE UP (ref 0.5–1.3)
EGFR: 74 ML/MIN/1.73M2 — SIGNIFICANT CHANGE UP
GLUCOSE SERPL-MCNC: 87 MG/DL — SIGNIFICANT CHANGE UP (ref 70–99)
HCT VFR BLD CALC: 45.6 % — SIGNIFICANT CHANGE UP (ref 39–50)
HGB BLD-MCNC: 15.3 G/DL — SIGNIFICANT CHANGE UP (ref 13–17)
MCHC RBC-ENTMCNC: 29.7 PG — SIGNIFICANT CHANGE UP (ref 27–34)
MCHC RBC-ENTMCNC: 33.6 GM/DL — SIGNIFICANT CHANGE UP (ref 32–36)
MCV RBC AUTO: 88.5 FL — SIGNIFICANT CHANGE UP (ref 80–100)
NRBC # BLD: 0 /100 WBCS — SIGNIFICANT CHANGE UP (ref 0–0)
NRBC # FLD: 0 K/UL — SIGNIFICANT CHANGE UP (ref 0–0)
PLATELET # BLD AUTO: 276 K/UL — SIGNIFICANT CHANGE UP (ref 150–400)
POTASSIUM SERPL-MCNC: 4.1 MMOL/L — SIGNIFICANT CHANGE UP (ref 3.5–5.3)
POTASSIUM SERPL-SCNC: 4.1 MMOL/L — SIGNIFICANT CHANGE UP (ref 3.5–5.3)
PROT SERPL-MCNC: 7.9 G/DL — SIGNIFICANT CHANGE UP (ref 6–8.3)
RBC # BLD: 5.15 M/UL — SIGNIFICANT CHANGE UP (ref 4.2–5.8)
RBC # FLD: 12.5 % — SIGNIFICANT CHANGE UP (ref 10.3–14.5)
SODIUM SERPL-SCNC: 138 MMOL/L — SIGNIFICANT CHANGE UP (ref 135–145)
WBC # BLD: 9.49 K/UL — SIGNIFICANT CHANGE UP (ref 3.8–10.5)
WBC # FLD AUTO: 9.49 K/UL — SIGNIFICANT CHANGE UP (ref 3.8–10.5)

## 2022-08-29 PROCEDURE — 93010 ELECTROCARDIOGRAM REPORT: CPT | Mod: 77

## 2022-08-29 PROCEDURE — 93010 ELECTROCARDIOGRAM REPORT: CPT

## 2022-08-29 NOTE — H&P PST ADULT - ASSESSMENT
62 year old male with PMH of HTN, presents to [facility], with pre op diagnosis of malignant neoplasm of prostate, for pre surgical evaluation prior to scheduled surgery- Insertion of radioactive seeds into the prostate with space OR gel with Dr Weems.

## 2022-08-29 NOTE — H&P PST ADULT - HISTORY OF PRESENT ILLNESS
62 year old male with PMH of HTN, presents to [facility], with pre op diagnosis of malignant neoplasm of prostate, for pre surgical evaluation prior to scheduled surgery- Insertion of radioactive seeds into the prostate with space OR gel with Dr Weems.

## 2022-08-29 NOTE — H&P PST ADULT - PROBLEM SELECTOR PLAN 1
Patient is tentatively scheduled for Insertion of radioactive seeds into the prostate with space OR gel with Dr Weems on 09/14/2022.    Pre-op instructions provided. Pt given verbal and written instructions with teach back on pepcid. Pt verbalized understanding with return demonstration.    Patient was covid positive- (PCR test ) on 08/14/2022, instructed patient not to have covid test prior to surgery. Patient is tentatively scheduled for Insertion of radioactive seeds into the prostate with space OR gel with Dr Weems on 09/14/2022.    Pre-op instructions provided. Pt given verbal and written instructions with teach back on Pepcid. Pt verbalized understanding with return demonstration.    Patient was covid positive- (PCR test ) on 08/14/2022, instructed patient not to have any  covid test prior to surgery, as per PST protocols. Patient is tentatively scheduled for Insertion of radioactive seeds into the prostate with space OAR gel with Dr Weems on 09/14/2022.    Pre-op instructions provided. Pt given verbal and written instructions with teach back on Pepcid. Pt verbalized understanding with return demonstration.    Patient was covid positive- (PCR test ) on 08/14/2022, instructed patient not to have any  covid test prior to surgery, as per PST protocols.

## 2022-08-29 NOTE — H&P PST ADULT - GASTROINTESTINAL COMMENTS
pt reports of anal fissure- scheduled for colonoscopy 09/01/2022 pt reports of anal fissure and hemorrhoids- scheduled for colonoscopy 09/01/2022 as per pt

## 2022-08-29 NOTE — H&P PST ADULT - PROBLEM SELECTOR PLAN 2
Patient with PMH of HLD, with Abnormal EKG at [facility] today, Unable to get comparison EKG-- PCP's office as it is closed.  Patient denies chest pain, SOB, Palpitations, dizziness or syncope.  Requesting medical evaluation prior to scheduled surgery- Patient has appointment with PCP/ cardiologist on 09/02/2022. will obtain a copy of medical evaluation    Will obtain comparison EKG, Echo and stress results from 2021. Patient with PMH of HLD, with Abnormal EKG at [facility] today, Unable to get comparison EKG-- PCP's office is closed today.  Patient denies chest pain, SOB, Palpitations, dizziness or syncope.  Requesting medical evaluation prior to scheduled surgery- Patient has appointment with PCP as well as cardiologist Dr Fletcher on 09/02/2022. will obtain a copy of medical evaluation    Will obtain comparison EKG, Echo and stress results from 2021.

## 2022-08-29 NOTE — H&P PST ADULT - ATTENDING COMMENTS
62M w/ prostate cancer and anal fissure; recently had assessment by colorectal surgery and cleared for endorectal procedures. Planning for permanent seed brachytherapy and spacer placement today. No changes in medical status since seen.

## 2022-08-29 NOTE — H&P PST ADULT - NSICDXPASTMEDICALHX_GEN_ALL_CORE_FT
PAST MEDICAL HISTORY:  Anal fissure     Erectile dysfunction     Hyperlipidemia     Prostate cancer      PAST MEDICAL HISTORY:  Anal fissure     Erectile dysfunction     Hemorrhoids     Hyperlipidemia     Prostate cancer

## 2022-08-29 NOTE — H&P PST ADULT - NSANTHOSAYNRD_GEN_A_CORE
minimum assist (75% patients effort) No. [place] screening performed.  STOP BANG Legend: 0-2 = LOW Risk; 3-4 = INTERMEDIATE Risk; 5-8 = HIGH Risk

## 2022-09-01 ENCOUNTER — APPOINTMENT (OUTPATIENT)
Dept: COLORECTAL SURGERY | Facility: CLINIC | Age: 62
End: 2022-09-01

## 2022-09-01 DIAGNOSIS — K63.5 POLYP OF COLON: ICD-10-CM

## 2022-09-01 DIAGNOSIS — K64.9 UNSPECIFIED HEMORRHOIDS: ICD-10-CM

## 2022-09-01 PROCEDURE — 45380 COLONOSCOPY AND BIOPSY: CPT

## 2022-09-07 LAB — CORE LAB BIOPSY: NORMAL

## 2022-09-13 ENCOUNTER — TRANSCRIPTION ENCOUNTER (OUTPATIENT)
Age: 62
End: 2022-09-13

## 2022-09-13 VITALS
DIASTOLIC BLOOD PRESSURE: 69 MMHG | TEMPERATURE: 98 F | OXYGEN SATURATION: 99 % | RESPIRATION RATE: 16 BRPM | SYSTOLIC BLOOD PRESSURE: 141 MMHG | HEART RATE: 66 BPM | WEIGHT: 216.05 LBS | HEIGHT: 67 IN

## 2022-09-13 PROCEDURE — 77316 BRACHYTX ISODOSE PLAN SIMPLE: CPT | Mod: 26

## 2022-09-13 NOTE — ASU PREOPERATIVE ASSESSMENT, ADULT (IPARK ONLY) - FALL HARM RISK - UNIVERSAL INTERVENTIONS
Call bell, personal items and telephone in reach/Instruct patient to call for assistance before getting out of bed or chair/Non-slip footwear when patient is out of bed/[place] to call system/Physically safe environment - no spills, clutter or unnecessary equipment/Purposeful Proactive Rounding

## 2022-09-14 ENCOUNTER — OUTPATIENT (OUTPATIENT)
Dept: OUTPATIENT SERVICES | Facility: HOSPITAL | Age: 62
LOS: 1 days | Discharge: HOME | End: 2022-09-14

## 2022-09-14 ENCOUNTER — TRANSCRIPTION ENCOUNTER (OUTPATIENT)
Age: 62
End: 2022-09-14

## 2022-09-14 VITALS
OXYGEN SATURATION: 97 % | TEMPERATURE: 96 F | HEART RATE: 69 BPM | SYSTOLIC BLOOD PRESSURE: 168 MMHG | DIASTOLIC BLOOD PRESSURE: 82 MMHG | RESPIRATION RATE: 18 BRPM

## 2022-09-14 DIAGNOSIS — Z98.890 OTHER SPECIFIED POSTPROCEDURAL STATES: Chronic | ICD-10-CM

## 2022-09-14 DIAGNOSIS — C61 MALIGNANT NEOPLASM OF PROSTATE: ICD-10-CM

## 2022-09-14 PROCEDURE — 77331 SPECIAL RADIATION DOSIMETRY: CPT | Mod: 26

## 2022-09-14 PROCEDURE — 55874 TPRNL PLMT BIODEGRDABL MATRL: CPT

## 2022-09-14 PROCEDURE — 77295 3-D RADIOTHERAPY PLAN: CPT | Mod: 26

## 2022-09-14 PROCEDURE — 77332 RADIATION TREATMENT AID(S): CPT | Mod: 26

## 2022-09-14 PROCEDURE — 76965 ECHO GUIDANCE RADIOTHERAPY: CPT | Mod: 26

## 2022-09-14 PROCEDURE — 77778 APPLY INTERSTIT RADIAT COMPL: CPT | Mod: 26

## 2022-09-14 PROCEDURE — 55875 TRANSPERI NEEDLE PLACE PROS: CPT

## 2022-09-14 DEVICE — HYDROGEL SPACEOAR DISP 10ML: Type: IMPLANTABLE DEVICE | Status: FUNCTIONAL

## 2022-09-14 RX ORDER — TADALAFIL 10 MG/1
1 TABLET, FILM COATED ORAL
Qty: 0 | Refills: 0 | DISCHARGE

## 2022-09-14 RX ORDER — ACETAMINOPHEN 500 MG
2 TABLET ORAL
Qty: 0 | Refills: 0 | DISCHARGE

## 2022-09-14 RX ORDER — ATORVASTATIN CALCIUM 80 MG/1
1 TABLET, FILM COATED ORAL
Qty: 0 | Refills: 0 | DISCHARGE

## 2022-09-14 NOTE — ASU DISCHARGE PLAN (ADULT/PEDIATRIC) - CARE PROVIDER_API CALL
Allen Weesm)  Radiation Oncology  [facility] - Dept. of Radiation Medicine, [address] [place] [address]  Phone: [phone]  Fax: [phone]  Follow Up Time:

## 2022-09-14 NOTE — ASU DISCHARGE PLAN (ADULT/PEDIATRIC) - NS MD DC FALL RISK RISK
For information on Fall & Injury Prevention, visit: [email or web address] OR  [email or web address] OR  https://www.cdc.gov/steadi/patient.html

## 2022-09-14 NOTE — ASU DISCHARGE PLAN (ADULT/PEDIATRIC) - CALL YOUR DOCTOR IF YOU HAVE ANY OF THE FOLLOWING:
After Visit Summary   5/17/2018    Shirley Lara    MRN: [id number]           Patient Information     Date Of Birth          1944        Visit Information        Provider Department      5/17/2018 12:55 PM Kristen Mcallister MD; LANGUAGE [facility]        Today's Diagnoses     Type 2 diabetes mellitus without complication, with long-term current use of insulin (H)    -  1    Benign essential hypertension        Encounter for health maintenance examination          Care Instructions    It was a pleasure seeing you in clinic. Today we discussed:   1) For your diabetes - please continue to take 1 mg of Glimepride and metformin. Please measure your blood sugars in the morning, 1-2 hours after meals and at nightime before bed.     2) Please stop taking hydrochlorothiazide. You will need to measure your blood pressure twice daily - once in the morning and once at night. Please measure the blood pressure 1-2 hours after you take your morning Losartan medication.     3) I would like you to return to visit in 2--3 weeks with labs. At this time we will consider a cognitive assessment as well as further management of your appetite and BP and diabetes.     Sincerely,  Kristen Mcallister     Please follow up with me in 2-3 weeks with labs                 Follow-ups after your visit        Additional Services     DIABETES EDUCATOR REFERRAL       DIABETES SELF MANAGEMENT TRAINING (DSMT)      Your provider has referred you to Diabetes Education: FMG: Diabetes Education - All [facility] [phone]   [email or web address]     If an urgent visit is needed or A1C is above 12, Care Team to call the Diabetes  Education Team at [phone] or send an In Basket message to the Diabetes Education Pool (P DIAB ED-PATIENT CARE).    A  will call you to make your appointment. If it has been more than 3 business days since  your referral was placed, please call the above phone number to schedule.    Type of training and number of hours: Previous Diagnosis: Follow-up DSMT - 2 hours.    Diabetes Type: T2DM        Diabetes Education Topics: Comprehensive Knowledge Assessment and Instruction    Special Educational Needs Requiring Individual DSMT: None      Please be aware that coverage of these services is subject to the terms and limitations of your health insurance plan.  Call member services at your health plan to determine Diabetes Self-Management Training (Codes  and ) and Medical Nutrition Therapy (Codes 83669 and 92455) benefits and ask which blood glucose monitor brands are covered by your plan.  Please bring the following with you to your appointment:    (1)  List of current medications   (2)  List of Blood Glucose Monitor brands that are covered by your insurance plan  (3)  Blood Glucose Monitor and log book  (4)   Food records for the 3 days prior to your visit    The Certified Diabetes Educator may make diabetes medication adjustments per the CDE Protocol and Collaborative Practice Agreement.                  Your next 10 appointments already scheduled     Jun 01, 2018  1:30 PM CDT   LAB with [facility] ([facility])    [address] [place] [address]  1st [facility] [address] [phone]           Please do not eat 10-12 hours before your appointment if you are coming in fasting for labs on lipids, cholesterol, or glucose (sugar). This does not apply to pregnant women. Water, hot tea and black coffee (with nothing added) are okay. Do not drink other fluids, diet soda or chew gum.            Jun 01, 2018  2:30 PM CDT   (Arrive by 2:15 PM)   Return Visit with RAMONA Cee [facility] ([facility])    [address] [place] [address]  4th [facility] [address] [phone]            Jun 18, 2018  2:30 PM CDT   (Arrive  by 2:15 PM)   Office Visit with Jacqueline Romero RD   [facility] ([facility] [place])    [address] [facility]  3rd [facility] [address] [phone]           Bring a current list of meds and any records pertaining to this visit. For Physicals, please bring immunization records and any forms needing to be filled out. Please arrive 10 minutes early to complete paperwork.              Future tests that were ordered for you today     Open Future Orders        Priority Expected Expires Ordered    Basic metabolic panel Routine 2018    Magnesium Routine 2018    CBC with platelets Routine 2018            Who to contact     Please call your clinic at [phone] to:    Ask questions about your health    Make or cancel appointments    Discuss your medicines    Learn about your test results    Speak to your doctor            Additional Information About Your Visit        [employer] Information     Vusay is an electronic gateway that provides easy, online access to your medical records. With Vusay, you can request a clinic appointment, read your test results, renew a prescription or communicate with your care team.     To sign up for Vusay visit the website at [email or web address]   You will be asked to enter the access code listed below, as well as some personal information. Please follow the directions to create your username and password.     Your access code is: [email or web address]  Expires: 2018  3:23 PM     Your access code will  in 90 days. If you need help or a new code, please contact your [facility] or call [phone] for assistance.        Care EveryWhere ID     This is your Care EveryWhere ID. This could be used by other organizations to access your [facility] medical records  [id number]        Your Vitals Were     Pulse Pulse Oximetry                56 97%            Blood Pressure from Last 3 Encounters:   05/17/18 112/67    Weight from Last 3 Encounters:   05/17/18 58.2 kg (128 lb 4.8 oz)              We Performed the Following     DIABETES EDUCATOR REFERRAL        Primary Care Provider Office Phone # Fax #    Kristen Mcallister [phone]       [address] [address]        Equal Access to Services     YOSELYN PARHAM : Hadii aad ku hadasho Soomaali, waaxda luqadaha, qaybta kaalmada adeegyada, waxay idiin hayaan adeeg khglenis la'aan ah. So [facility] [phone].    ATENCIÓN: Si boni gunn, tiene a stout disposición servicios gratuitos de asistencia lingüística. Llame al [phone].    We comply with applicable federal civil rights laws and [place] laws. We do not discriminate on the basis of race, color, national origin, age, disability, sex, sexual orientation, or gender identity.            Thank you!     Thank you for choosing [facility]  for your care. Our goal is always to provide you with excellent care. Hearing back from our patients is one way we can continue to improve our services. Please take a few minutes to complete the written survey that you may receive in the mail after your visit with us. Thank you!             Your Updated Medication List - Protect others around you: Learn how to safely use, store and throw away your medicines at www.disposemymeds.org.          This list is accurate as of 5/17/18  4:01 PM.  Always use your most recent med list.                   Brand Name Dispense Instructions for use Diagnosis    aspirin 81 MG EC tablet      Take 81 mg by mouth        atorvastatin 10 MG tablet    LIPITOR     Take 10 mg by mouth        glimepiride 1 MG tablet    AMARYL     Take 1 mg by mouth        hydrochlorothiazide 25 MG tablet    HYDRODIURIL     Take 12.5 mg by mouth        losartan 25 MG tablet    COZAAR     Take 25 mg by mouth        metFORMIN 500 MG tablet    GLUCOPHAGE      Take 1,000 mg by mouth        metoprolol tartrate 50 MG tablet    LOPRESSOR     Take 50 mg by mouth        vitamin D 47253 UNIT capsule    ERGOCALCIFEROL     Take 50,000 Units by mouth           Fever greater than (need to indicate Fahrenheit or Celsius)/Wound/Surgical Site with redness, or foul smelling discharge or pus/Unable to urinate/Inability to tolerate liquids or foods

## 2022-09-14 NOTE — BRIEF OPERATIVE NOTE - NSICDXBRIEFPROCEDURE_GEN_ALL_CORE_FT
PROCEDURES:  Insertion, radioactive seed, prostate 14-Sep-2022 12:54:40  Allen Weems  Injection, hydrogel spacer 14-Sep-2022 12:54:51  Allen Weems

## 2022-09-14 NOTE — PACU DISCHARGE NOTE - PAIN:
Quality 431: Preventive Care And Screening: Unhealthy Alcohol Use - Screening: Patient screened for unhealthy alcohol use using a single question and scores less than 2 times per year Quality 226: Preventive Care And Screening: Tobacco Use: Screening And Cessation Intervention: Patient screened for tobacco and never smoked Quality 110: Preventive Care And Screening: Influenza Immunization: Influenza Immunization previously received during influenza season Detail Level: Detailed Quality 131: Pain Assessment And Follow-Up: Pain assessment using a standardized tool is documented as negative, no follow-up plan required Quality 47: Advance Care Plan: Advance Care Planning discussed and documented; advance care plan or surrogate decision maker documented in the medical record. Controlled with current regime

## 2022-09-16 DIAGNOSIS — R06.6 HICCOUGH: ICD-10-CM

## 2022-09-20 PROBLEM — N52.9 MALE ERECTILE DYSFUNCTION, UNSPECIFIED: Chronic | Status: ACTIVE | Noted: 2022-08-29

## 2022-09-20 PROBLEM — E78.5 HYPERLIPIDEMIA, UNSPECIFIED: Chronic | Status: ACTIVE | Noted: 2022-08-29

## 2022-09-20 PROBLEM — C61 MALIGNANT NEOPLASM OF PROSTATE: Chronic | Status: ACTIVE | Noted: 2022-08-29

## 2022-09-20 PROBLEM — K60.2 ANAL FISSURE, UNSPECIFIED: Chronic | Status: ACTIVE | Noted: 2022-08-29

## 2022-09-20 PROBLEM — K64.9 UNSPECIFIED HEMORRHOIDS: Chronic | Status: ACTIVE | Noted: 2022-08-29

## 2022-09-22 ENCOUNTER — NON-APPOINTMENT (OUTPATIENT)
Age: 62
End: 2022-09-22

## 2022-09-30 ENCOUNTER — NON-APPOINTMENT (OUTPATIENT)
Age: 62
End: 2022-09-30

## 2022-10-05 PROCEDURE — 77334 RADIATION TREATMENT AID(S): CPT | Mod: 26

## 2022-10-05 PROCEDURE — 77470 SPECIAL RADIATION TREATMENT: CPT | Mod: 26

## 2022-10-05 PROCEDURE — 77290 THER RAD SIMULAJ FIELD CPLX: CPT | Mod: 26

## 2022-10-12 ENCOUNTER — OUTPATIENT (OUTPATIENT)
Dept: OUTPATIENT SERVICES | Facility: HOSPITAL | Age: 62
LOS: 1 days | End: 2022-10-12
Payer: COMMERCIAL

## 2022-10-12 ENCOUNTER — APPOINTMENT (OUTPATIENT)
Dept: RADIOLOGY | Facility: IMAGING CENTER | Age: 62
End: 2022-10-12

## 2022-10-12 ENCOUNTER — RESULT REVIEW (OUTPATIENT)
Age: 62
End: 2022-10-12

## 2022-10-12 DIAGNOSIS — Z00.8 ENCOUNTER FOR OTHER GENERAL EXAMINATION: ICD-10-CM

## 2022-10-12 DIAGNOSIS — Z98.890 OTHER SPECIFIED POSTPROCEDURAL STATES: Chronic | ICD-10-CM

## 2022-10-12 PROCEDURE — 72190 X-RAY EXAM OF PELVIS: CPT

## 2022-10-12 PROCEDURE — 71045 X-RAY EXAM CHEST 1 VIEW: CPT

## 2022-10-12 PROCEDURE — 72190 X-RAY EXAM OF PELVIS: CPT | Mod: 26

## 2022-10-12 PROCEDURE — 71045 X-RAY EXAM CHEST 1 VIEW: CPT | Mod: 26

## 2022-10-25 ENCOUNTER — APPOINTMENT (OUTPATIENT)
Dept: RADIATION ONCOLOGY | Facility: CLINIC | Age: 62
End: 2022-10-25

## 2022-10-25 VITALS
RESPIRATION RATE: 16 BRPM | OXYGEN SATURATION: 97 % | TEMPERATURE: 98 F | BODY MASS INDEX: 32.61 KG/M2 | SYSTOLIC BLOOD PRESSURE: 153 MMHG | WEIGHT: 220.79 LBS | HEART RATE: 80 BPM | DIASTOLIC BLOOD PRESSURE: 85 MMHG

## 2022-10-25 PROCEDURE — 99024 POSTOP FOLLOW-UP VISIT: CPT

## 2022-10-25 RX ORDER — MULTIVIT-MIN/FOLIC/VIT K/LYCOP 400-300MCG
1000 TABLET ORAL
Refills: 0 | Status: ACTIVE | COMMUNITY

## 2022-10-25 RX ORDER — SODIUM PICOSULFATE, MAGNESIUM OXIDE, AND ANHYDROUS CITRIC ACID 10; 3.5; 12 MG/160ML; G/160ML; G/160ML
10-3.5-12 MG-GM LIQUID ORAL
Qty: 320 | Refills: 0 | Status: DISCONTINUED | COMMUNITY
Start: 2022-07-22 | End: 2022-10-25

## 2022-10-25 RX ORDER — METOCLOPRAMIDE 10 MG/1
10 TABLET ORAL 3 TIMES DAILY
Qty: 30 | Refills: 0 | Status: DISCONTINUED | COMMUNITY
Start: 2022-09-16 | End: 2022-10-25

## 2022-10-25 RX ORDER — SILDENAFIL 20 MG/1
20 TABLET ORAL
Qty: 30 | Refills: 1 | Status: DISCONTINUED | COMMUNITY
Start: 2022-04-19 | End: 2022-10-25

## 2022-10-25 NOTE — HISTORY OF PRESENT ILLNESS
[FreeTextEntry1] : Mr. Storm is a 62 year old man with unfavorable intermediate risk prostate cancer. \par \par HPI:\par His PSA increased from 4.55ng/ml on 4/6/2021 to 10.20 on 4/19/22. Since then:\par 5/26/22: 6.17\par 6/16/22: 6.39\par \par He had a multiparametric prostate MRI on 6/10/22 which showed a 4 x 4 x 4.6cm = 39cc gland and a b/l peripheral zone lesion with capsular irregularity and neurovascular abutment without sam NEETA; no SVI or LAD. \par \par On his biopsy on 7/6/22, 7/13 cores were positive with highest Michael score 3+4=7, the remainder [place] 6.  He takes tadalafil. \par \par Colonoscopy 9/1/22 showed internal hemorrhoids, 2 small benign polyps. \par \par Underwent permanent prostate seed implant on 9/14/22; 125 Gy using Pd-103, 96 seeds via 16 needles. Hydrogel spacer placed. Had increased urgency and weaker streatm post implant; AZO and tamsulosin started. \par \par Today 10/25/22 presents for followup: No major urinary issues as he continues to use AZO 1-2 times daily, reports urinary urgency when he attempted to stop. He continues on Tamsulosin. \par He is able to achieve strong erections although he takes Cialis for occasional sexual activity. He does have discomfort from anal fissure/hemorrhoids and is using a steroid cream for this, reports rare blood upon wiping after bowel movement. Denies diarrhea or constipation.\par IPSS/EPIC Score 10/13

## 2022-10-25 NOTE — PHYSICAL EXAM
[Normal] : well developed, well nourished, in no acute distress [Sclera] : the sclera and conjunctiva were normal [Outer Ear] : the ears and nose were normal in appearance [] : no respiratory distress [Respiration, Rhythm And Depth] : normal respiratory rhythm and effort [Exaggerated Use Of Accessory Muscles For Inspiration] : no accessory muscle use [Arterial Pulses Normal] : the arterial pulses were normal [Edema] : no peripheral edema present [Abdomen Soft] : soft [Nondistended] : nondistended [Nail Clubbing] : no clubbing  or cyanosis of the fingernails [Skin Color & Pigmentation] : normal skin color and pigmentation [No Focal Deficits] : no focal deficits [Oriented To Time, Place, And Person] : oriented to person, place, and time [Extraocular Movements] : extraocular movements were intact [de-identified] : anxious, presssured speech

## 2022-10-25 NOTE — REVIEW OF SYSTEMS
[Anxiety] : anxiety [Negative] : Allergic/Immunologic [Constipation: Grade 0] : Constipation: Grade 0 [Diarrhea: Grade 0] : Diarrhea: Grade 0 [Urinary Urgency: Grade 1 - Present] : Urinary Urgency: Grade 1 - Present [Urinary Frequency: Grade 1 - Present] : Urinary Frequency: Grade 1 - Present [Eye Pain] : no eye pain [Visual Disturbances] : no visual disturbances [Dysphagia] : no dysphagia [Nosebleeds] : no nosebleeds [Chest Pain] : no chest pain [Palpitations] : no palpitations [Lower Ext Edema] : no lower extremity edema [Shortness Of Breath] : no shortness of breath [Cough] : no cough [SOB on Exertion] : no shortness of breath during exertion [Nocturia] : no nocturia [Urinary Frequency] : no urinary frequency [Joint Pain] : no joint pain [Disturbance Of Gait] : no gait disturbance [Confused] : no confusion [Dizziness] : no dizziness [FreeTextEntry7] :  under tx rectal fissure with colorectal MD [FreeTextEntry8] : Taking AZO and Tamsulosin

## 2022-10-25 NOTE — DISEASE MANAGEMENT
[1] : T1 [c] : c [0] : M0 [0-10] : 0 -10 ng/mL [Biopsy with Fusion] : Patient had a biopsy with fusion on [7(3+4)] : Template Biopsy [place] Score: 7(3+4) [] : Patient had a Prostate MRI [5] : 5 [IIB] : IIB [BiopsyDate] : 07/22 [MeasuredProstateVolume] : 39 [TotalCores] : 13 [TotalPositiveCores] : 7 [MaxCoreInvolvement] : 95

## 2022-10-27 PROCEDURE — 77295 3-D RADIOTHERAPY PLAN: CPT | Mod: 26

## 2023-01-14 ENCOUNTER — LABORATORY RESULT (OUTPATIENT)
Age: 63
End: 2023-01-14

## 2023-01-31 ENCOUNTER — APPOINTMENT (OUTPATIENT)
Dept: RADIATION ONCOLOGY | Facility: CLINIC | Age: 63
End: 2023-01-31
Payer: COMMERCIAL

## 2023-01-31 VITALS
HEIGHT: 69 IN | SYSTOLIC BLOOD PRESSURE: 138 MMHG | RESPIRATION RATE: 17 BRPM | DIASTOLIC BLOOD PRESSURE: 79 MMHG | TEMPERATURE: 97.5 F | WEIGHT: 225.39 LBS | BODY MASS INDEX: 33.38 KG/M2 | OXYGEN SATURATION: 98 % | HEART RATE: 64 BPM

## 2023-01-31 PROCEDURE — 99213 OFFICE O/P EST LOW 20 MIN: CPT

## 2023-01-31 RX ORDER — TAMSULOSIN HYDROCHLORIDE 0.4 MG/1
0.4 CAPSULE ORAL
Qty: 90 | Refills: 2 | Status: DISCONTINUED | COMMUNITY
Start: 2022-09-16 | End: 2023-01-31

## 2023-01-31 RX ORDER — HYDROCORTISONE 25 MG/G
2.5 CREAM TOPICAL
Refills: 0 | Status: DISCONTINUED | COMMUNITY
End: 2023-01-31

## 2023-01-31 NOTE — REVIEW OF SYSTEMS
[Anxiety] : anxiety [Negative] : Allergic/Immunologic [Constipation: Grade 0] : Constipation: Grade 0 [Diarrhea: Grade 0] : Diarrhea: Grade 0 [Urinary Urgency: Grade 1 - Present] : Urinary Urgency: Grade 1 - Present [Urinary Frequency: Grade 1 - Present] : Urinary Frequency: Grade 1 - Present [Eye Pain] : no eye pain [Visual Disturbances] : no visual disturbances [Dysphagia] : no dysphagia [Nosebleeds] : no nosebleeds [Chest Pain] : no chest pain [Palpitations] : no palpitations [Lower Ext Edema] : no lower extremity edema [Shortness Of Breath] : no shortness of breath [Cough] : no cough [SOB on Exertion] : no shortness of breath during exertion [Nocturia] : no nocturia [Urinary Frequency] : no urinary frequency [Joint Pain] : no joint pain [Disturbance Of Gait] : no gait disturbance [Confused] : no confusion [Dizziness] : no dizziness [FreeTextEntry7] :  under tx rectal fissure with colorectal MD [FreeTextEntry8] : Taking AZO and Tamsulosin [Hematuria: Grade 0] : Hematuria: Grade 0 [Urinary Incontinence: Grade 0] : Urinary Incontinence: Grade 0  [Urinary Tract Pain: Grade 0] : Urinary Tract Pain: Grade 0

## 2023-02-01 NOTE — DISEASE MANAGEMENT
[1] : T1 [c] : c [0] : M0 [0-10] : 0 -10 ng/mL [Biopsy with Fusion] : Patient had a biopsy with fusion on [7(3+4)] : Template Biopsy [place] Score: 7(3+4) [] : Patient had a Prostate MRI [5] : 5 [IIB] : IIB [BiopsyDate] : 07/22 [MeasuredProstateVolume] : 39 [TotalCores] : 13 [TotalPositiveCores] : 7 [MaxCoreInvolvement] : 95

## 2023-02-01 NOTE — HISTORY OF PRESENT ILLNESS
[FreeTextEntry1] : Mr. Storm is a 62 year old man with unfavorable intermediate risk prostate cancer. \par \par HPI:\par His PSA increased from 4.55ng/ml on 4/6/2021 to 10.20 on 4/19/22. Since then:\par 5/26/22: 6.17\par 6/16/22: 6.39\par \par He had a multiparametric prostate MRI on 6/10/22 which showed a 4 x 4 x 4.6cm = 39cc gland and a b/l peripheral zone lesion with capsular irregularity and neurovascular abutment without sam NEETA; no SVI or LAD. \par \par On his biopsy on 7/6/22, 7/13 cores were positive with highest Michael score 3+4=7, the remainder [place] 6.  He takes tadalafil. \par \par Colonoscopy 9/1/22 showed internal hemorrhoids, 2 small benign polyps. \par \par Underwent permanent prostate seed implant on 9/14/22; 125 Gy using Pd-103, 96 seeds via 16 needles. Hydrogel spacer placed. Had increased urgency and weaker streatm post implant; AZO and tamsulosin started. \par \par 10/25/22  followup: No major urinary issues as he continues to use AZO 1-2 times daily, reports urinary urgency when he attempted to stop. He continues on Tamsulosin. \par He is able to achieve strong erections although he takes Cialis for occasional sexual activity. He does have discomfort from anal fissure/hemorrhoids and is using a steroid cream for this, reports rare blood upon wiping after bowel movement. Denies diarrhea or constipation.\par IPSS/EPIC Score 10/13\par \par PSA trend: iPSA\par 5/26/22: 6.17\par 6/16/22: 6.39\par -> prostate seed implant on 9/14/22\par 1/14/23: 0.99\par \par Today 1/31/23: Feels generally well.  symptoms almost back to baseline. Nocturia 1x,  fair to good stream.   Denies dribbling, dysuria, hematuria or bowel symptoms. Tamsulosin 1 tab at night, has missed doses without worsening symptoms. Reports erections only suitable for masturbation with cialis, takes 5 mg intermittently \par IPSS/QOL/EPIC score 4/2/10. No f/u scheduled with Dr. Terry.

## 2023-02-01 NOTE — PHYSICAL EXAM
[Normal] : well developed, well nourished, in no acute distress [Sclera] : the sclera and conjunctiva were normal [Extraocular Movements] : extraocular movements were intact [Outer Ear] : the ears and nose were normal in appearance [] : no respiratory distress [Respiration, Rhythm And Depth] : normal respiratory rhythm and effort [Arterial Pulses Normal] : the arterial pulses were normal [Exaggerated Use Of Accessory Muscles For Inspiration] : no accessory muscle use [Edema] : no peripheral edema present [Abdomen Soft] : soft [Nondistended] : nondistended [Nail Clubbing] : no clubbing  or cyanosis of the fingernails [Skin Color & Pigmentation] : normal skin color and pigmentation [No Focal Deficits] : no focal deficits [Oriented To Time, Place, And Person] : oriented to person, place, and time [Hearing Threshold Finger Rub Not [place]] : hearing was normal [Range of Motion to Joints] : range of motion to joints [Motor Exam] : the motor exam was normal [Affect] : the affect was normal [de-identified] : anxious

## 2023-02-17 ENCOUNTER — NON-APPOINTMENT (OUTPATIENT)
Age: 63
End: 2023-02-17

## 2023-02-18 ENCOUNTER — EMERGENCY (EMERGENCY)
Facility: HOSPITAL | Age: 63
LOS: 1 days | Discharge: HOME | End: 2023-02-18
Attending: EMERGENCY MEDICINE
Payer: COMMERCIAL

## 2023-02-18 VITALS
SYSTOLIC BLOOD PRESSURE: 150 MMHG | RESPIRATION RATE: 18 BRPM | DIASTOLIC BLOOD PRESSURE: 87 MMHG | TEMPERATURE: 99 F | HEART RATE: 80 BPM | OXYGEN SATURATION: 100 %

## 2023-02-18 VITALS
RESPIRATION RATE: 18 BRPM | WEIGHT: 225.09 LBS | TEMPERATURE: 98 F | HEART RATE: 92 BPM | DIASTOLIC BLOOD PRESSURE: 89 MMHG | HEIGHT: 69 IN | SYSTOLIC BLOOD PRESSURE: 161 MMHG | OXYGEN SATURATION: 98 %

## 2023-02-18 DIAGNOSIS — Z98.890 OTHER SPECIFIED POSTPROCEDURAL STATES: Chronic | ICD-10-CM

## 2023-02-18 LAB
ALBUMIN SERPL ELPH-MCNC: 4.3 G/DL — SIGNIFICANT CHANGE UP (ref 3.3–5)
ALP SERPL-CCNC: 60 U/L — SIGNIFICANT CHANGE UP (ref 40–120)
ALT FLD-CCNC: 18 U/L — SIGNIFICANT CHANGE UP (ref 10–45)
ANION GAP SERPL CALC-SCNC: 18 MMOL/L — HIGH (ref 5–17)
APPEARANCE UR: CLEAR — SIGNIFICANT CHANGE UP
AST SERPL-CCNC: 22 U/L — SIGNIFICANT CHANGE UP (ref 10–40)
BACTERIA # UR AUTO: NEGATIVE — SIGNIFICANT CHANGE UP
BASE EXCESS BLDV CALC-SCNC: -1.5 MMOL/L — SIGNIFICANT CHANGE UP (ref -2–3)
BASOPHILS # BLD AUTO: 0.01 K/UL — SIGNIFICANT CHANGE UP (ref 0–0.2)
BASOPHILS NFR BLD AUTO: 0.1 % — SIGNIFICANT CHANGE UP (ref 0–2)
BILIRUB SERPL-MCNC: 0.9 MG/DL — SIGNIFICANT CHANGE UP (ref 0.2–1.2)
BILIRUB UR-MCNC: NEGATIVE — SIGNIFICANT CHANGE UP
BLD GP AB SCN SERPL QL: NEGATIVE — SIGNIFICANT CHANGE UP
BUN SERPL-MCNC: 16 MG/DL — SIGNIFICANT CHANGE UP (ref 7–23)
CALCIUM SERPL-MCNC: 8.7 MG/DL — SIGNIFICANT CHANGE UP (ref 8.4–10.5)
CHLORIDE SERPL-SCNC: 100 MMOL/L — SIGNIFICANT CHANGE UP (ref 96–108)
CO2 BLDV-SCNC: 26 MMOL/L — SIGNIFICANT CHANGE UP (ref 22–26)
CO2 SERPL-SCNC: 16 MMOL/L — LOW (ref 22–31)
COLOR SPEC: SIGNIFICANT CHANGE UP
CREAT SERPL-MCNC: 1.1 MG/DL — SIGNIFICANT CHANGE UP (ref 0.5–1.3)
DIFF PNL FLD: NEGATIVE — SIGNIFICANT CHANGE UP
EGFR: 77 ML/MIN/1.73M2 — SIGNIFICANT CHANGE UP
EOSINOPHIL # BLD AUTO: 0.03 K/UL — SIGNIFICANT CHANGE UP (ref 0–0.5)
EOSINOPHIL NFR BLD AUTO: 0.3 % — SIGNIFICANT CHANGE UP (ref 0–6)
EPI CELLS # UR: 0 /HPF — SIGNIFICANT CHANGE UP
GAS PNL BLDV: SIGNIFICANT CHANGE UP
GLUCOSE SERPL-MCNC: 94 MG/DL — SIGNIFICANT CHANGE UP (ref 70–99)
GLUCOSE UR QL: NEGATIVE — SIGNIFICANT CHANGE UP
HCO3 BLDV-SCNC: 25 MMOL/L — SIGNIFICANT CHANGE UP (ref 22–29)
HCT VFR BLD CALC: 47.2 % — SIGNIFICANT CHANGE UP (ref 39–50)
HGB BLD-MCNC: 15.3 G/DL — SIGNIFICANT CHANGE UP (ref 13–17)
HYALINE CASTS # UR AUTO: 1 /LPF — SIGNIFICANT CHANGE UP (ref 0–2)
IMM GRANULOCYTES NFR BLD AUTO: 0.5 % — SIGNIFICANT CHANGE UP (ref 0–0.9)
KETONES UR-MCNC: NEGATIVE — SIGNIFICANT CHANGE UP
LEUKOCYTE ESTERASE UR-ACNC: NEGATIVE — SIGNIFICANT CHANGE UP
LIDOCAIN IGE QN: 13 U/L — SIGNIFICANT CHANGE UP (ref 7–60)
LYMPHOCYTES # BLD AUTO: 1 K/UL — SIGNIFICANT CHANGE UP (ref 1–3.3)
LYMPHOCYTES # BLD AUTO: 9.8 % — LOW (ref 13–44)
MCHC RBC-ENTMCNC: 29.8 PG — SIGNIFICANT CHANGE UP (ref 27–34)
MCHC RBC-ENTMCNC: 32.4 GM/DL — SIGNIFICANT CHANGE UP (ref 32–36)
MCV RBC AUTO: 92 FL — SIGNIFICANT CHANGE UP (ref 80–100)
MONOCYTES # BLD AUTO: 0.87 K/UL — SIGNIFICANT CHANGE UP (ref 0–0.9)
MONOCYTES NFR BLD AUTO: 8.5 % — SIGNIFICANT CHANGE UP (ref 2–14)
NEUTROPHILS # BLD AUTO: 8.29 K/UL — HIGH (ref 1.8–7.4)
NEUTROPHILS NFR BLD AUTO: 80.8 % — HIGH (ref 43–77)
NITRITE UR-MCNC: NEGATIVE — SIGNIFICANT CHANGE UP
NRBC # BLD: 0 /100 WBCS — SIGNIFICANT CHANGE UP (ref 0–0)
PCO2 BLDV: 47 MMHG — SIGNIFICANT CHANGE UP (ref 42–55)
PH BLDV: 7.33 — SIGNIFICANT CHANGE UP (ref 7.32–7.43)
PH UR: 6 — SIGNIFICANT CHANGE UP (ref 5–8)
PLATELET # BLD AUTO: 176 K/UL — SIGNIFICANT CHANGE UP (ref 150–400)
PO2 BLDV: 41 MMHG — SIGNIFICANT CHANGE UP (ref 25–45)
POTASSIUM SERPL-MCNC: 3.5 MMOL/L — SIGNIFICANT CHANGE UP (ref 3.5–5.3)
POTASSIUM SERPL-SCNC: 3.5 MMOL/L — SIGNIFICANT CHANGE UP (ref 3.5–5.3)
PROT SERPL-MCNC: 7.4 G/DL — SIGNIFICANT CHANGE UP (ref 6–8.3)
PROT UR-MCNC: SIGNIFICANT CHANGE UP
RBC # BLD: 5.13 M/UL — SIGNIFICANT CHANGE UP (ref 4.2–5.8)
RBC # FLD: 12.7 % — SIGNIFICANT CHANGE UP (ref 10.3–14.5)
RBC CASTS # UR COMP ASSIST: 1 /HPF — SIGNIFICANT CHANGE UP (ref 0–4)
RH IG SCN BLD-IMP: POSITIVE — SIGNIFICANT CHANGE UP
SAO2 % BLDV: 64.8 % — LOW (ref 67–88)
SODIUM SERPL-SCNC: 134 MMOL/L — LOW (ref 135–145)
SP GR SPEC: 1.02 — SIGNIFICANT CHANGE UP (ref 1.01–1.02)
UROBILINOGEN FLD QL: NEGATIVE — SIGNIFICANT CHANGE UP
WBC # BLD: 10.25 K/UL — SIGNIFICANT CHANGE UP (ref 3.8–10.5)
WBC # FLD AUTO: 10.25 K/UL — SIGNIFICANT CHANGE UP (ref 3.8–10.5)
WBC UR QL: 1 /HPF — SIGNIFICANT CHANGE UP (ref 0–5)

## 2023-02-18 PROCEDURE — 83690 ASSAY OF LIPASE: CPT

## 2023-02-18 PROCEDURE — 99283 EMERGENCY DEPT VISIT LOW MDM: CPT

## 2023-02-18 PROCEDURE — 86850 RBC ANTIBODY SCREEN: CPT

## 2023-02-18 PROCEDURE — 81001 URINALYSIS AUTO W/SCOPE: CPT

## 2023-02-18 PROCEDURE — 36415 COLL VENOUS BLD VENIPUNCTURE: CPT

## 2023-02-18 PROCEDURE — 86901 BLOOD TYPING SEROLOGIC RH(D): CPT

## 2023-02-18 PROCEDURE — 99284 EMERGENCY DEPT VISIT MOD MDM: CPT

## 2023-02-18 PROCEDURE — 86900 BLOOD TYPING SEROLOGIC ABO: CPT

## 2023-02-18 PROCEDURE — 82803 BLOOD GASES ANY COMBINATION: CPT

## 2023-02-18 PROCEDURE — 80053 COMPREHEN METABOLIC PANEL: CPT

## 2023-02-18 PROCEDURE — 85025 COMPLETE CBC W/AUTO DIFF WBC: CPT

## 2023-02-18 RX ORDER — SODIUM CHLORIDE 9 MG/ML
1000 INJECTION, SOLUTION INTRAVENOUS ONCE
Refills: 0 | Status: COMPLETED | OUTPATIENT
Start: 2023-02-18 | End: 2023-02-18

## 2023-02-18 RX ADMIN — SODIUM CHLORIDE 1000 MILLILITER(S): 9 INJECTION, SOLUTION INTRAVENOUS at 03:04

## 2023-02-18 NOTE — ED PROVIDER NOTE - CLINICAL SUMMARY MEDICAL DECISION MAKING FREE TEXT BOX
62M with h/o PCa (s/p radio seeds 09/2022), HLD presents with 1.5 days of watery, black diarrhea associated with 1x vomiting, fever x1. DDx broad but includes UGIB c/b catharsis and thus diarrhea/gastritis, gastroenteritis. No clear indication that pt has urethral-rectal fistulization given clearly normal-appearing at bedside. If H/H altered from prior ,will obtain imaging to assess for bleeding; if everything is normal, would PO challenge & monitor BMs while here if he has any prior to obtaining imaging.   - Hold off on imaging for now as pt very well-appearing, pending labs  - Labs incl H/H, T&S, PTT/PT/INR 62M with h/o PCa (s/p radio seeds 09/2022), HLD presents with 1.5 days of watery, black diarrhea associated with 1x vomiting, fever x1. DDx broad but includes UGIB c/b catharsis and thus diarrhea/gastritis, gastroenteritis. No clear indication that pt has urethral-rectal fistulization given clearly normal-appearing at bedside. If H/H altered from prior , may obtain imaging to assess for bleeding; if everything is normal, would PO challenge & monitor BMs while here if he has any prior to obtaining imaging.   - Hold off on imaging for now as pt very well-appearing, pending labs  - Labs incl H/H, T&S, PTT/PT/INR

## 2023-02-18 NOTE — ED ADULT TRIAGE NOTE - AS TEMP SITE
Please call patient and provide her the phone number to [phone] [facility] Breast Consultant at [phone] for advice. oral

## 2023-02-18 NOTE — ED PROVIDER NOTE - PROGRESS NOTE DETAILS
Allen Hager MD PGY-2  H/H very much WNL. UA unremarkable. Vitals appropriate for age. Unclear if pt has mild radiation proctitis or other brachytherapy related side effect or simply gastroenteritis, but both of these can be further elucidated via OP testing.

## 2023-02-18 NOTE — ED PROVIDER NOTE - PHYSICAL EXAMINATION
GEN: Awake, AOx3, NAD.  HEENT: NCAT  ---EYES: no scleral icterus, EOMI, PERRLA  CARDIO: RRR. Normal S1/S2, no m/r/g. No JVD.  RESP: CTAB, no w/r/r  ABD: Soft, mildly distended vs body habitus; "just discomfort" on whole abd palpation  : No CVAT.   MSK: No obvious deformity or ROM deficit.   SKIN: Warm, dry.   NEURO: Moves all four extremities spontaneously  PSYCH: Appropriate mood & affect.

## 2023-02-18 NOTE — ED PROVIDER NOTE - OBJECTIVE STATEMENT
62M with h/o PCa (s/p radioseeds 09/2022), JEREMIAS presents with 1.5 days of vague abd pain and watery, black diarrhea. He says that he had 1x episode of vomiting & TMax 101, responsive to acetaminophen (last taken a 3pm). He denies taking any NSAIDs in the last few days but took 400mg x1 last week.    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 He has had no urinary symptoms and his urine is at bedside and is CYU. He denies any foul smelling urine or other urinary changes.

## 2023-02-18 NOTE — ED PROVIDER NOTE - ATTENDING CONTRIBUTION TO CARE
Attending MD Johnson:  I personally have seen and examined this patient. I have performed a substantive portion of the visit including all aspects of the medical decision making.  Resident note reviewed and agree on plan of care and except where noted.      62-year-old gentleman with a history of prostate cancer hyperlipidemia presenting for 1 day of watery diarrhea and black diarrhea.  Also fever cough and not generalized abdominal discomfort.  No bright red blood in the stool.  No travel.    Vital signs are nonactionable.  Patient lying in the stretcher in no apparent distress.  Abdomen is soft nontender nondistended.  Mucous membranes are moist.  Normal affect, moves all extremity spontaneously.    Likely self-limited diarrheal illness.  Clinically I do not suspect significant acute GI bleed.  I do not suspect surgical pathology given nontender abdomen.  We will obtain screening labs provide IV fluid bolus and reassess.        *The above represents an initial assessment/impression. Please refer to progress notes for potential changes in patient clinical course*

## 2023-02-18 NOTE — ED ADULT NURSE NOTE - NSICDXPASTMEDICALHX_GEN_ALL_CORE_FT
PAST MEDICAL HISTORY:  Anal fissure     Erectile dysfunction     Hemorrhoids     Hyperlipidemia     Prostate cancer

## 2023-02-18 NOTE — ED PROVIDER NOTE - NSFOLLOWUPINSTRUCTIONS_ED_ALL_ED_FT
You were seen in the Emergency Department for diarrhea. You described it as "black", so we checked your blood counts, which were normal. You provided a urine sample, which did not show any evidence of communication between your urinary system and your bowels, thankfully. It's possible that you have a mild gastroenteritis, but you may have some side effects of your brachytherapy for your prostate.     Fortunately, it does not seem to have caused an emergent problem that might otherwise require immediate intervention. You should follow-up with your Primary Care Provider and your urologist. We will provide contact information for Gastroenterology who may offer more evaluation if your symptoms persist or recur. You were seen in the Emergency Department for diarrhea. You described it as "black", so we checked your blood counts, which were normal. You provided a urine sample, which did not show any evidence of communication between your urinary system and your bowels, thankfully. It's possible that you have a mild gastroenteritis, but you may have some side effects of your brachytherapy for your prostate.     Fortunately, it does not seem to have caused an emergent problem that might otherwise require immediate intervention. You should follow-up with your Primary Care Provider and your urologist. We will provide contact information for Gastroenterology who may offer more evaluation if your symptoms persist or recur.    Please return to the Emergency Department if you develop recurrence of your symptoms, bloody stools, You were seen in the Emergency Department for diarrhea. You described it as "black", so we checked your blood counts, which were normal. You provided a urine sample, which did not show any evidence of communication between your urinary system and your bowels, thankfully. It's possible that you have a mild gastroenteritis, but you may have some side effects of your brachytherapy for your prostate.     Fortunately, it does not seem to have caused an emergent problem that might otherwise require immediate intervention. You should follow-up with your Primary Care Provider and your urologist. We will provide contact information for Gastroenterology who may offer more evaluation if your symptoms persist or recur.    Please return to the Emergency Department if you develop recurrence of your symptoms, bloody stools, dizziness, fatigue, inability to eat / drink, or any other symptoms that you would like to have evaulated.

## 2023-02-18 NOTE — ED PROVIDER NOTE - PATIENT PORTAL LINK FT
You can access the FollowMyHealth Patient Portal offered by [facility] by registering at the following website: [email or web address]. By joining [email or web address]’s FollowMyHealth portal, you will also be able to view your health information using other applications (apps) compatible with our system.

## 2023-02-18 NOTE — ED PROVIDER NOTE - CARE PROVIDER_API CALL
Allen Weems)  Radiation Oncology  [address] [place] [address]  Phone: [phone]  Fax: [phone]  Established Patient  Follow Up Time: 1-3 Days

## 2023-02-18 NOTE — ED ADULT TRIAGE NOTE - NSTRIAGECARE_GEN_A_ER
Face Mask You were given intravenous TYLENOL for pain management at ___7:30am____________. Please DO NOT take any products containing TYLENOL or ACETAMINOPHEN, such as VICODIN, PERCOCET, EXCEDRIN, and any over-the-counter cold medication for the next 6 hours (until _____1:30pm_________). DO NOT TAKE MORE THAN 3000 MG OF TYLENOL in a 24 hour period.           You were given intravenous TORADOL for pain management at ____7:30am__________. Please DO NOT take ibuprofen containing products such as MOTRIN or ADVIL, or any other NSAIDs (Non-Steroidal Anti-Inflammatory Drugs) such as ALEVE for the next 6 hours (until ____1:30pm__________).

## 2023-02-18 NOTE — ED PROVIDER NOTE - NS ED ROS FT
GEN: as per HPI  EYES: No vision changes, irritation, itchiness  ENT: No ear pain, congestion, sore throat  RESP: No cough or trouble breathing  CARDIOVASCULAR: No chest pain or palpitations  GI: as per HPI  :  No change in urine output; no dysuria, hematuria, or discharge  MSK: No joint or muscle pain  SKIN: No rashes  NEURO: No headache; no abnormal movements; no numbness or tingling  Remainder negative, except as per the HPI

## 2023-02-18 NOTE — ED PROVIDER NOTE - NS ED ATTENDING STATEMENT MOD
Post discharge [facility] f/u call.  Spoke with patient at home. States doing well.  Verified f/u appt. Next week with surgeon.  No questions concerning meds or any needs from [employer].  
Attending with

## 2023-04-26 LAB — PSA SERPL-MCNC: 1.01 NG/ML

## 2023-05-05 ENCOUNTER — APPOINTMENT (OUTPATIENT)
Dept: RADIATION ONCOLOGY | Facility: CLINIC | Age: 63
End: 2023-05-05
Payer: COMMERCIAL

## 2023-05-05 VITALS
TEMPERATURE: 97.34 F | HEART RATE: 67 BPM | HEIGHT: 69 IN | OXYGEN SATURATION: 99 % | SYSTOLIC BLOOD PRESSURE: 134 MMHG | RESPIRATION RATE: 17 BRPM | BODY MASS INDEX: 33.09 KG/M2 | DIASTOLIC BLOOD PRESSURE: 77 MMHG | WEIGHT: 223.43 LBS

## 2023-05-05 PROCEDURE — 99213 OFFICE O/P EST LOW 20 MIN: CPT

## 2023-05-05 RX ORDER — PNV NO.95/FERROUS FUM/FOLIC AC 28MG-0.8MG
TABLET ORAL
Refills: 0 | Status: DISCONTINUED | COMMUNITY
End: 2023-05-05

## 2023-05-05 NOTE — DISEASE MANAGEMENT
[1] : T1 [c] : c [0] : M0 [0-10] : 0 -10 ng/mL [Biopsy with Fusion] : Patient had a biopsy with fusion on [7(3+4)] : Template Biopsy [place] Score: 7(3+4) [5] : 5 [] : Patient had a Prostate MRI [IIB] : IIB [BiopsyDate] : 07/22 [MeasuredProstateVolume] : 39 [TotalCores] : 13 [TotalPositiveCores] : 7 [MaxCoreInvolvement] : 95

## 2023-05-05 NOTE — HISTORY OF PRESENT ILLNESS
[FreeTextEntry1] : Mr. Storm is a 62 year old man with unfavorable intermediate risk prostate cancer, s/p definitive Pd-103 prostate implant on 9/14/22. \par \par HPI:\par His PSA increased from 4.55ng/ml on 4/6/2021 to 10.20 on 4/19/22. Since then:\par 5/26/22: 6.17\par 6/16/22: 6.39\par \par He had a multiparametric prostate MRI on 6/10/22 which showed a 4 x 4 x 4.6cm = 39cc gland and a b/l peripheral zone lesion with capsular irregularity and neurovascular abutment without sam NEETA; no SVI or LAD. \par \par On his biopsy on 7/6/22, 7/13 cores were positive with highest Michael score 3+4=7, the remainder [place] 6.  He takes tadalafil. \par \par Colonoscopy 9/1/22 showed internal hemorrhoids, 2 small benign polyps. \par \par Underwent permanent prostate seed implant on 9/14/22; 125 Gy using Pd-103, 96 seeds via 16 needles. Hydrogel spacer placed. Had increased urgency and weaker streatm post implant; AZO and tamsulosin started. \par \par 10/25/22  followup: No major urinary issues as he continues to use AZO 1-2 times daily, reports urinary urgency when he attempted to stop. He continues on Tamsulosin. \par He is able to achieve strong erections although he takes Cialis for occasional sexual activity. He does have discomfort from anal fissure/hemorrhoids and is using a steroid cream for this, reports rare blood upon wiping after bowel movement. Denies diarrhea or constipation.\par IPSS/EPIC Score 10/13\par \par PSA trend: iPSA\par 5/26/22: 6.17\par 6/16/22: 6.39\par -> prostate seed implant on 9/14/22\par 1/14/23: 0.99\par 4/25/23: 1.01\par \par 1/31/23:follow up\par Feels generally well.  symptoms almost back to baseline. Nocturia 1x,  fair to good stream.   Denies dribbling, dysuria, hematuria or bowel symptoms. Tamsulosin 1 tab at night, has missed doses without worsening symptoms. Reports erections only suitable for masturbation with cialis, takes 5 mg intermittently \par IPSS/QOL/EPIC score 4/2/10. \par \par 5/4/23: follow up\par Feels generally well.  symptoms back to baseline. Nocturia 1x,  good stream.  Rare dribbling with urgency. Doing kegels but not consistently. Denies dysuria, hematuria or bowel symptoms. Tamsulosin 1 tab at night. Reports erections only suitable for masturbation with cialis, takes 5 mg daily\par IPSS/QOL/EPIC score 1/2/7. \par

## 2023-05-05 NOTE — PHYSICAL EXAM
[Normal] : well developed, well nourished, in no acute distress [Sclera] : the sclera and conjunctiva were normal [Extraocular Movements] : extraocular movements were intact [Outer Ear] : the ears and nose were normal in appearance [Hearing Threshold Finger Rub Not [place]] : hearing was normal [] : no respiratory distress [Respiration, Rhythm And Depth] : normal respiratory rhythm and effort [Exaggerated Use Of Accessory Muscles For Inspiration] : no accessory muscle use [Arterial Pulses Normal] : the arterial pulses were normal [Edema] : no peripheral edema present [Abdomen Soft] : soft [Nondistended] : nondistended [Nail Clubbing] : no clubbing  or cyanosis of the fingernails [Range of Motion to Joints] : range of motion to joints [Skin Color & Pigmentation] : normal skin color and pigmentation [No Focal Deficits] : no focal deficits [Motor Exam] : the motor exam was normal [Oriented To Time, Place, And Person] : oriented to person, place, and time [Affect] : the affect was normal [de-identified] : anxious

## 2023-05-05 NOTE — REVIEW OF SYSTEMS
[Constipation: Grade 0] : Constipation: Grade 0 [Diarrhea: Grade 0] : Diarrhea: Grade 0 [Hematuria: Grade 0] : Hematuria: Grade 0 [Urinary Tract Pain: Grade 0] : Urinary Tract Pain: Grade 0 [Urinary Urgency: Grade 1 - Present] : Urinary Urgency: Grade 1 - Present [Urinary Frequency: Grade 1 - Present] : Urinary Frequency: Grade 1 - Present [Urinary Incontinence: Grade 1 - Occasional (e.g., with coughing, sneezing, etc.), pads not indicated] : Urinary Incontinence: Grade 1 - Occasional (e.g., with coughing, sneezing, etc.), pads not indicated

## 2023-08-08 LAB — PSA SERPL-MCNC: 0.84 NG/ML

## 2023-08-22 ENCOUNTER — APPOINTMENT (OUTPATIENT)
Dept: RADIATION ONCOLOGY | Facility: CLINIC | Age: 63
End: 2023-08-22
Payer: COMMERCIAL

## 2023-08-22 VITALS
WEIGHT: 222 LBS | RESPIRATION RATE: 17 BRPM | OXYGEN SATURATION: 97 % | TEMPERATURE: 97.16 F | HEIGHT: 69 IN | SYSTOLIC BLOOD PRESSURE: 124 MMHG | HEART RATE: 68 BPM | DIASTOLIC BLOOD PRESSURE: 73 MMHG | BODY MASS INDEX: 32.88 KG/M2

## 2023-08-22 PROCEDURE — 99213 OFFICE O/P EST LOW 20 MIN: CPT

## 2023-08-22 RX ORDER — TAMSULOSIN HYDROCHLORIDE 0.4 MG/1
0.4 CAPSULE ORAL
Qty: 30 | Refills: 6 | Status: ACTIVE | COMMUNITY
Start: 2023-01-31 | End: 1900-01-01

## 2023-08-22 NOTE — END OF VISIT
[FreeTextEntry3] : I saw and examined this patient on the date of service with my assigned resident physician, Dr. Roberto Carlos Ingram. I was involved in all procedures and laboratory/radiographic assessments. I personally confirmed pertinent history and exam findings and reviewed the patient's diagnosis and plan with them. I have reviewed and edited the resident note and agree with their overall plan. Additional comments below.  63M w/ prostate cancer, UIR rcT2c G7(3+4) iPSA 6.39 6/16/22. MRI prostate 6/10/22 showed 4 x 4 x 4.6 cm = 39 cc prostate (4.6 SI x 4.4 LAT by 3.5 AP = 37 cc on my review), with dominant lesion in right and left posterior medial PZ base to mid (3.2 cm, AL-5), with capsule irregularity (no clear NEETA on my review); b/l NVB abutment, no clear SVI or LAD. Biopsy on 7/6/22 showed benign findings in the target lesion; G7(3+4) disease in the left posterior medial apex (1/1, 40%, +PNI) and G6(3+3) disease in 6 other sites bilaterally, 7/13 sites positive. He underwent permanent seed implant with Pd-103 on 9/14/22.  Today is doing well, symptoms at baseline. PSA response is good, now under 1, past the bounce. Refilled medications. May consider flomax taper. Labs in 6 months with followup after.  [Time Spent: ___ minutes] : I have spent [unfilled] minutes of time on the encounter.

## 2023-08-22 NOTE — DISEASE MANAGEMENT
[1] : T1 [c] : c [0] : M0 [0-10] : 0 -10 ng/mL [Biopsy with Fusion] : Patient had a biopsy with fusion on [7(3+4)] : Template Biopsy [place] Score: 7(3+4) [] : Patient had a Prostate MRI [5] : 5 [IIB] : IIB [Radiation Therapy] : Radiation Therapy [Treatment with radiation therapy] : Treatment with radiation therapy [LDR] : LDR [BiopsyDate] : 07/22 [MeasuredProstateVolume] : 39 [TotalCores] : 13 [TotalPositiveCores] : 7 [MaxCoreInvolvement] : 95 [EBRT] : No EBRT [HDR] : No HDR [RadiationCompletedDate] : 9/14/22 [LDRDose] : [id number] [LDRFractions] : 1

## 2023-08-22 NOTE — HISTORY OF PRESENT ILLNESS
[FreeTextEntry1] : Mr. Storm is a 63 year old man with unfavorable intermediate risk prostate cancer, s/p definitive Pd-103 prostate implant on 9/14/22.   HPI: His PSA increased from 4.55ng/ml on 4/6/2021 to 10.20 on 4/19/22. Since then: 5/26/22: 6.17 6/16/22: 6.39 - He had a multiparametric prostate MRI on 6/10/22 which showed a 4 x 4 x 4.6cm = 39cc gland and a b/l peripheral zone lesion with capsular irregularity and neurovascular abutment without sam NEETA; no SVI or LAD.  - On his biopsy on 7/6/22, 7/13 cores were positive with highest [place] score 3+4=7, the remainder [place] 6.  He takes tadalafil.  - Colonoscopy 9/1/22 showed internal hemorrhoids, 2 small benign polyps.  Baseline IPSS/EPIC Score 6/18  Underwent permanent prostate seed implant on 9/14/22; 125 Gy using Pd-103, 96 seeds via 16 needles.  Hydrogel spacer placed. Had increased urgency and weaker stream post implant; AZO and tamsulosin started.   10/25/22  followup: No major urinary issues as he continues to use AZO 1-2 times daily, reports urinary urgency when he attempted to stop. He continues on Tamsulosin.  He is able to achieve strong erections although he takes Cialis for occasional sexual activity. He does have discomfort from anal fissure/hemorrhoids and is using a steroid cream for this, reports rare blood upon wiping after bowel movement. Denies diarrhea or constipation. IPSS/EPIC Score 10/13  1/31/23:follow up Feels generally well.  symptoms almost back to baseline. Nocturia 1x,  fair to good stream.   Denies dribbling, dysuria, hematuria or bowel symptoms. Tamsulosin 1 tab at night, has missed doses without worsening symptoms. Reports erections only suitable for masturbation with cialis, takes 5 mg intermittently  IPSS/QOL/EPIC score 4/2/10.  - 2/17/23: reported sudden onset watery diarrhea, resolved  5/4/23: follow up Feels generally well.  symptoms back to baseline. Nocturia 1x, good stream.  Rare dribbling with urgency. Doing kegels but not consistently. Denies dysuria, hematuria or bowel symptoms. Tamsulosin 1 tab at night. Reports erections only suitable for masturbation with cialis, takes 5 mg daily IPSS/QOL/EPIC score 1/2/7.   PSA trend: iPSA 5/26/22: 6.17 6/16/22: 6.39 -> prostate seed implant on 9/14/22 1/14/23: 0.99 4/25/23: 1.01 8/7/23: 0.84  8/22/23: Followup  Doing well overall. Bowel function normal. Erectile function (did not have adt) on cialis 5mg, states he is dissatisfied with function for 20 years but able to masturbate, although not a major issue for him. Urinary function is good on flomax qpm, nocturia x 1. Doing kegels.  IPSS/QOL/EPIC: 5/2/15

## 2023-08-22 NOTE — PHYSICAL EXAM
[Normal] : well developed, well nourished, in no acute distress [Extraocular Movements] : extraocular movements were intact [Sclera] : the sclera and conjunctiva were normal [Outer Ear] : the ears and nose were normal in appearance [Hearing Threshold Finger Rub Not [place]] : hearing was normal [] : no respiratory distress [Respiration, Rhythm And Depth] : normal respiratory rhythm and effort [Exaggerated Use Of Accessory Muscles For Inspiration] : no accessory muscle use [Arterial Pulses Normal] : the arterial pulses were normal [Edema] : no peripheral edema present [Abdomen Soft] : soft [Nondistended] : nondistended [Nail Clubbing] : no clubbing  or cyanosis of the fingernails [Range of Motion to Joints] : range of motion to joints [Skin Color & Pigmentation] : normal skin color and pigmentation [No Focal Deficits] : no focal deficits [Motor Exam] : the motor exam was normal [Oriented To Time, Place, And Person] : oriented to person, place, and time [Affect] : the affect was normal [Motor Tone] : muscle strength and tone were normal [de-identified] : anxious

## 2023-08-22 NOTE — REVIEW OF SYSTEMS
[Constipation: Grade 0] : Constipation: Grade 0 [Diarrhea: Grade 0] : Diarrhea: Grade 0 [Hematuria: Grade 0] : Hematuria: Grade 0 [Urinary Incontinence: Grade 1 - Occasional (e.g., with coughing, sneezing, etc.), pads not indicated] : Urinary Incontinence: Grade 1 - Occasional (e.g., with coughing, sneezing, etc.), pads not indicated [Urinary Tract Pain: Grade 0] : Urinary Tract Pain: Grade 0 [Urinary Urgency: Grade 1 - Present] : Urinary Urgency: Grade 1 - Present [Urinary Frequency: Grade 1 - Present] : Urinary Frequency: Grade 1 - Present [Negative] : Neurological [Urinary Incontinence: Grade 0] : Urinary Incontinence: Grade 0  [Urinary Urgency: Grade 0] : Urinary Urgency: Grade 0 [Erectile Dysfunction: Grade 1 - Decrease in erectile function (frequency or rigidity of erections) but intervention not indicated (e.g., medication or use of mechanical device, penile pump)] : Erectile Dysfunction: Grade 1 - Decrease in erectile function (frequency or rigidity of erections) but intervention not indicated (e.g., medication or use of mechanical device, penile pump)

## 2023-09-14 NOTE — ED ADULT NURSE NOTE - HOW MANY DRINKS CONTAINING ALCOHOL DO YOU HAVE ON A TYPICAL DAY WHEN YOU ARE DRINKING?
"[place] DISCHARGE NOTE    Patient discharged to home at 12:56 PM via ambulation. Accompanied by other: Patient ambulated self out; she stated \"I'm going out to smoke\" and is going to wait for her ride outside. Discharge instructions reviewed with patient, opportunity offered to ask questions. Prescriptions - None ordered for discharge. All belongings sent with patient.  Given handout regarding gastroenteritis.    Lily Hernandez RN    Goal Outcome Evaluation:      Plan of Care Reviewed With: patient                 " 1 or 2

## 2023-11-10 NOTE — ED ADULT NURSE NOTE - OBJECTIVE STATEMENT
General Pt is 62Y M, pmhx HTN, HLD, c/o diffuse abdominal pain, diarrhea, and fever for 24 hours. Pt states he has had loose, watery stools for 24 hours, no blood in stool, no nausea, fever recorded last night of 101F, pt currently afebrile, pt states abdominal pain is diffuse. Pt is A&Ox3, ambulatory, pt denies any SOB, chest pain, vomiting, or any other symptoms.

## 2024-02-05 LAB — PSA SERPL-MCNC: 0.32 NG/ML

## 2024-02-13 ENCOUNTER — APPOINTMENT (OUTPATIENT)
Dept: RADIATION ONCOLOGY | Facility: CLINIC | Age: 64
End: 2024-02-13
Payer: COMMERCIAL

## 2024-02-13 VITALS
HEART RATE: 90 BPM | OXYGEN SATURATION: 96 % | BODY MASS INDEX: 32.56 KG/M2 | TEMPERATURE: 96.6 F | RESPIRATION RATE: 17 BRPM | HEIGHT: 69 IN | DIASTOLIC BLOOD PRESSURE: 81 MMHG | WEIGHT: 219.8 LBS | SYSTOLIC BLOOD PRESSURE: 144 MMHG

## 2024-02-13 PROCEDURE — 99213 OFFICE O/P EST LOW 20 MIN: CPT | Mod: GC

## 2024-02-13 RX ORDER — TAMSULOSIN HYDROCHLORIDE 0.4 MG/1
0.4 CAPSULE ORAL
Qty: 90 | Refills: 3 | Status: ACTIVE | COMMUNITY
Start: 2023-06-18 | End: 1900-01-01

## 2024-02-13 NOTE — HISTORY OF PRESENT ILLNESS
[FreeTextEntry1] : Mr. Storm is a 63 year old man with unfavorable intermediate risk prostate cancer, s/p definitive Pd-103 prostate implant on 9/14/22.   HPI: His PSA increased from 4.55ng/ml on 4/6/2021 to 10.20 on 4/19/22. Since then: 5/26/22: 6.17 6/16/22: 6.39 - He had a multiparametric prostate MRI on 6/10/22 which showed a 4 x 4 x 4.6cm = 39cc gland and a b/l peripheral zone lesion with capsular irregularity and neurovascular abutment without sam NEETA; no SVI or LAD.  - On his biopsy on 7/6/22, 7/13 cores were positive with highest [place] score 3+4=7, the remainder [place] 6.  He takes tadalafil.  - Colonoscopy 9/1/22 showed internal hemorrhoids, 2 small benign polyps.  Baseline IPSS/EPIC Score 6/18  Underwent permanent prostate seed implant on 9/14/22; 125 Gy using Pd-103, 96 seeds via 16 needles.  Hydrogel spacer placed. Had increased urgency and weaker stream post implant; AZO and tamsulosin started.   10/25/22  followup: No major urinary issues as he continues to use AZO 1-2 times daily, reports urinary urgency when he attempted to stop. He continues on Tamsulosin.  He is able to achieve strong erections although he takes Cialis for occasional sexual activity. He does have discomfort from anal fissure/hemorrhoids and is using a steroid cream for this, reports rare blood upon wiping after bowel movement. Denies diarrhea or constipation. IPSS/EPIC Score 10/13  1/31/23:follow up Feels generally well.  symptoms almost back to baseline. Nocturia 1x,  fair to good stream.   Denies dribbling, dysuria, hematuria or bowel symptoms. Tamsulosin 1 tab at night, has missed doses without worsening symptoms. Reports erections only suitable for masturbation with cialis, takes 5 mg intermittently  IPSS/QOL/EPIC score 4/2/10.  - 2/17/23: reported sudden onset watery diarrhea, resolved  5/4/23: follow up Feels generally well.  symptoms back to baseline. Nocturia 1x, good stream.  Rare dribbling with urgency. Doing kegels but not consistently. Denies dysuria, hematuria or bowel symptoms. Tamsulosin 1 tab at night. Reports erections only suitable for masturbation with cialis, takes 5 mg daily IPSS/QOL/EPIC score 1/2/7.   8/22/23: Followup  Doing well overall. Bowel function normal. Erectile function (did not have adt) on cialis 5mg, states he is dissatisfied with function for 20 years but able to masturbate, although not a major issue for him. Urinary function is good on flomax qpm, nocturia x 1. Doing kegels.  IPSS/QOL/EPIC: 5/2/15  PSA trend: iPSA 5/26/22: 6.17 6/16/22: 6.39 -> prostate seed implant on 9/14/22 1/14/23: 0.99 4/25/23: 1.01 8/7/23: 0.84 2/5/24: 0.32   2/13/24 Followup.  Patient reports nocturia 2-3 times every night. He denies any burning, no urgency, no frequency. He also reports occasional dribbling and denies constipation. Some gas and bloating; using metamucil. He reports he has erections sufficient for masturbation, but don't last; he is not sexually active with a partner. He continues to take Flomax 0.4mg nightly, and Tadalafil 5mg daily.  IPSS/QOL/EPIC:  7/2/16

## 2024-02-13 NOTE — PHYSICAL EXAM
[Normal] : well developed, well nourished, in no acute distress [Sclera] : the sclera and conjunctiva were normal [Extraocular Movements] : extraocular movements were intact [Outer Ear] : the ears and nose were normal in appearance [Hearing Threshold Finger Rub Not [place]] : hearing was normal [] : no respiratory distress [Respiration, Rhythm And Depth] : normal respiratory rhythm and effort [Exaggerated Use Of Accessory Muscles For Inspiration] : no accessory muscle use [Arterial Pulses Normal] : the arterial pulses were normal [Edema] : no peripheral edema present [Abdomen Soft] : soft [Nondistended] : nondistended [Nail Clubbing] : no clubbing  or cyanosis of the fingernails [Range of Motion to Joints] : range of motion to joints [Motor Tone] : muscle strength and tone were normal [Skin Color & Pigmentation] : normal skin color and pigmentation [No Focal Deficits] : no focal deficits [Motor Exam] : the motor exam was normal [Oriented To Time, Place, And Person] : oriented to person, place, and time [Affect] : the affect was normal [General Appearance - Well Developed] : well developed [General Appearance - Well Nourished] : well nourished [General Appearance - Alert] : alert [General Appearance - In No Acute Distress] : in no acute distress [de-identified] : anxious

## 2024-02-13 NOTE — DISEASE MANAGEMENT
[1] : T1 [c] : c [0] : M0 [0-10] : 0 -10 ng/mL [Biopsy with Fusion] : Patient had a biopsy with fusion on [7(3+4)] : Template Biopsy [place] Score: 7(3+4) [] : Patient had a Prostate MRI [5] : 5 [IIB] : IIB [Radiation Therapy] : Radiation Therapy [Treatment with radiation therapy] : Treatment with radiation therapy [LDR] : LDR [BiopsyDate] : 07/22 [MeasuredProstateVolume] : 39 [TotalPositiveCores] : 7 [TotalCores] : 13 [MaxCoreInvolvement] : 95 [EBRT] : No EBRT [HDR] : No HDR [RadiationCompletedDate] : 9/14/22 [LDRDose] : [id number] [LDRFractions] : 1

## 2024-02-13 NOTE — END OF VISIT
[FreeTextEntry3] : I saw and examined this patient on the date of service with my assigned resident physician, Dr. Maisha Nash. I was involved in all procedures and laboratory/radiographic assessments. I personally confirmed pertinent history and exam findings and reviewed the patient's diagnosis and plan with them. I have reviewed and edited the resident note and agree with their overall plan. Additional comments below.  63M w/ prostate cancer, UIR rcT2c G7(3+4) iPSA 6.39 6/16/22. MRI prostate 6/10/22 showed 4 x 4 x 4.6 cm = 39 cc prostate (4.6 SI x 4.4 LAT by 3.5 AP = 37 cc on my review), with dominant lesion in right and left posterior medial PZ base to mid (3.2 cm, ND-5), with capsule irregularity (no clear NEETA on my review); b/l NVB abutment, no clear SVI or LAD. Biopsy on 7/6/22 showed benign findings in the target lesion; G7(3+4) disease in the left posterior medial apex (1/1, 40%, +PNI) and G6(3+3) disease in 6 other sites bilaterally, 7/13 sites positive. He underwent permanent seed implant with Pd-103 on 9/14/22.  Today is doing well, symptoms at baseline.  PSA response is very good. Refilled medications and will add viagra to help with erections. Reviewed use of simethicone/Beano for gas.  Labs in 6 months with followup after. [Time Spent: ___ minutes] : I have spent [unfilled] minutes of time on the encounter.

## 2024-02-13 NOTE — REVIEW OF SYSTEMS
[Constipation: Grade 0] : Constipation: Grade 0 [Diarrhea: Grade 0] : Diarrhea: Grade 0 [Hematuria: Grade 0] : Hematuria: Grade 0 [Urinary Incontinence: Grade 0] : Urinary Incontinence: Grade 0  [Urinary Tract Pain: Grade 0] : Urinary Tract Pain: Grade 0 [Urinary Urgency: Grade 0] : Urinary Urgency: Grade 0 [Urinary Frequency: Grade 1 - Present] : Urinary Frequency: Grade 1 - Present [Erectile Dysfunction: Grade 1 - Decrease in erectile function (frequency or rigidity of erections) but intervention not indicated (e.g., medication or use of mechanical device, penile pump)] : Erectile Dysfunction: Grade 1 - Decrease in erectile function (frequency or rigidity of erections) but intervention not indicated (e.g., medication or use of mechanical device, penile pump) [Nocturia] : nocturia [Skin Rash] : skin rash [Anxiety] : anxiety [Negative] : Allergic/Immunologic [FreeTextEntry8] : urinary dribbling, decreased erections [Ejaculation Disorder: Grade 1 - Diminished ejaculation] : Ejaculation Disorder: Grade 1 - Diminished ejaculation

## 2024-02-22 ENCOUNTER — RX RENEWAL (OUTPATIENT)
Age: 64
End: 2024-02-22

## 2024-02-22 RX ORDER — TADALAFIL 5 MG/1
5 TABLET ORAL
Qty: 30 | Refills: 11 | Status: ACTIVE | COMMUNITY
Start: 2022-07-21 | End: 1900-01-01

## 2024-06-18 ENCOUNTER — APPOINTMENT (OUTPATIENT)
Dept: UROLOGY | Facility: CLINIC | Age: 64
End: 2024-06-18
Payer: COMMERCIAL

## 2024-06-18 DIAGNOSIS — C61 MALIGNANT NEOPLASM OF PROSTATE: ICD-10-CM

## 2024-06-18 DIAGNOSIS — N52.35 ERECTILE DYSFUNCTION FOLLOWING RADIATION THERAPY: ICD-10-CM

## 2024-06-18 PROCEDURE — G2211 COMPLEX E/M VISIT ADD ON: CPT | Mod: NC,1L

## 2024-06-18 PROCEDURE — 99214 OFFICE O/P EST MOD 30 MIN: CPT

## 2024-06-18 RX ORDER — SILDENAFIL 100 MG/1
100 TABLET, FILM COATED ORAL
Qty: 90 | Refills: 3 | Status: DISCONTINUED | COMMUNITY
Start: 2024-02-13 | End: 2024-06-18

## 2024-06-18 RX ORDER — TADALAFIL 20 MG/1
20 TABLET ORAL
Qty: 30 | Refills: 6 | Status: ACTIVE | COMMUNITY
Start: 2024-06-18 | End: 1900-01-01

## 2024-06-18 NOTE — DISEASE MANAGEMENT
[1] : T1 [c] : c [0] : M0 [0-10] : 0 -10 ng/mL [Biopsy with Fusion] : Patient had a biopsy with fusion on [7(3+4)] : Template Biopsy [place] Score: 7(3+4) [] : Patient had a Prostate MRI [5] : 5 [IIB] : IIB [Treatment with radiation therapy] : Treatment with radiation therapy [BiopsyDate] : 07/22 [MeasuredProstateVolume] : 39 [TotalCores] : 13 [TotalPositiveCores] : 7 [MaxCoreInvolvement] : 95

## 2024-06-18 NOTE — PHYSICAL EXAM
[General Appearance - In No Acute Distress] : in no acute distress [] : no respiratory distress [Normal] : no focal deficits [Oriented To Time, Place, And Person] : oriented to person, place, and time

## 2024-06-18 NOTE — HISTORY OF PRESENT ILLNESS
[FreeTextEntry1] : Mr. Storm is a 63-year-old man with unfavorable intermediate risk prostate cancer, s/p definitive Pd-103 prostate implant on 9/14/22. Presents today for follow up.   Remains on tamsulosin 0.4mg for BPH.   Last PSA - 02/05/2024 - 0.32ng/mL  Urinary function: Stable. Denies hematuria or dysuria  ED: Poor erection previously on sildenafil with mild improvement. Trialing Tadalafil 5mg daily with no improvement. Recommend add tadalafil 20mg PRN.

## 2024-08-22 ENCOUNTER — APPOINTMENT (OUTPATIENT)
Dept: RADIATION ONCOLOGY | Facility: CLINIC | Age: 64
End: 2024-08-22
Payer: COMMERCIAL

## 2024-08-22 VITALS
DIASTOLIC BLOOD PRESSURE: 79 MMHG | TEMPERATURE: 95.18 F | OXYGEN SATURATION: 98 % | RESPIRATION RATE: 17 BRPM | HEART RATE: 73 BPM | HEIGHT: 69 IN | SYSTOLIC BLOOD PRESSURE: 133 MMHG

## 2024-08-22 PROCEDURE — 99214 OFFICE O/P EST MOD 30 MIN: CPT

## 2024-08-26 NOTE — END OF VISIT
[FreeTextEntry3] : I saw and examined this patient on the date of service with my assigned resident physician, Dr. Maisha Nash. I was involved in all procedures and laboratory/radiographic assessments. I personally confirmed pertinent history and exam findings and reviewed the patient's diagnosis and plan with them. I have reviewed and edited the resident note and agree with their overall plan. Additional comments below.  63M w/ prostate cancer, UIR rcT2c G7(3+4) iPSA 6.39 6/16/22. MRI prostate 6/10/22 showed 4 x 4 x 4.6 cm = 39 cc prostate (4.6 SI x 4.4 LAT by 3.5 AP = 37 cc on my review), with dominant lesion in right and left posterior medial PZ base to mid (3.2 cm, MA-5), with capsule irregularity (no clear NEETA on my review); b/l NVB abutment, no clear SVI or LAD. Biopsy on 7/6/22 showed benign findings in the target lesion; G7(3+4) disease in the left posterior medial apex (1/1, 40%, +PNI) and G6(3+3) disease in 6 other sites bilaterally, 7/13 sites positive. He underwent permanent seed implant with Pd-103 on 9/14/22.  Today is doing well, symptoms at baseline.  PSA response is very good. Refilled medications and will add viagra to help with erections. Reviewed use of simethicone/Beano for gas.  Labs in 6 months with followup after. [Time Spent: ___ minutes] : I have spent [unfilled] minutes of time on the encounter which excludes teaching and separately reported services.

## 2024-08-26 NOTE — REVIEW OF SYSTEMS
[Nocturia] : nocturia [Skin Rash] : skin rash [Anxiety] : anxiety [Negative] : Allergic/Immunologic [Constipation: Grade 0] : Constipation: Grade 0 [Diarrhea: Grade 0] : Diarrhea: Grade 0 [Hematuria: Grade 0] : Hematuria: Grade 0 [Urinary Incontinence: Grade 0] : Urinary Incontinence: Grade 0  [Urinary Urgency: Grade 0] : Urinary Urgency: Grade 0 [Urinary Frequency: Grade 1 - Present] : Urinary Frequency: Grade 1 - Present [Ejaculation Disorder: Grade 1 - Diminished ejaculation] : Ejaculation Disorder: Grade 1 - Diminished ejaculation  [Erectile Dysfunction: Grade 1 - Decrease in erectile function (frequency or rigidity of erections) but intervention not indicated (e.g., medication or use of mechanical device, penile pump)] : Erectile Dysfunction: Grade 1 - Decrease in erectile function (frequency or rigidity of erections) but intervention not indicated (e.g., medication or use of mechanical device, penile pump) [IPSS Score (0-40): ___] : IPSS score: [unfilled] [EPIC-CP Score (0-60): ___] : EPIC-CP score: [unfilled] [Urinary Retention: Grade 0] : Urinary Retention: Grade 0 [Urinary Tract Pain: Grade 0] : Urinary Tract Pain: Grade 0 [Fever] : no fever [Chills] : no chills [Night Sweats] : no night sweats [Chest Pain] : no chest pain [Palpitations] : no palpitations [Shortness Of Breath] : no shortness of breath [FreeTextEntry8] : urinary dribbling, decreased erections

## 2024-08-26 NOTE — END OF VISIT
[FreeTextEntry3] : I saw and examined this patient on the date of service with my assigned resident physician, Dr. Maisha Nash. I was involved in all procedures and laboratory/radiographic assessments. I personally confirmed pertinent history and exam findings and reviewed the patient's diagnosis and plan with them. I have reviewed and edited the resident note and agree with their overall plan. Additional comments below.  63M w/ prostate cancer, UIR rcT2c G7(3+4) iPSA 6.39 6/16/22. MRI prostate 6/10/22 showed 4 x 4 x 4.6 cm = 39 cc prostate (4.6 SI x 4.4 LAT by 3.5 AP = 37 cc on my review), with dominant lesion in right and left posterior medial PZ base to mid (3.2 cm, NE-5), with capsule irregularity (no clear NEETA on my review); b/l NVB abutment, no clear SVI or LAD. Biopsy on 7/6/22 showed benign findings in the target lesion; G7(3+4) disease in the left posterior medial apex (1/1, 40%, +PNI) and G6(3+3) disease in 6 other sites bilaterally, 7/13 sites positive. He underwent permanent seed implant with Pd-103 on 9/14/22.  Today is doing well, symptoms at baseline.  PSA response is very good. Refilled medications and will add viagra to help with erections. Reviewed use of simethicone/Beano for gas.  Labs in 6 months with followup after. [Time Spent: ___ minutes] : I have spent [unfilled] minutes of time on the encounter which excludes teaching and separately reported services.

## 2024-08-26 NOTE — DISEASE MANAGEMENT
[1] : T1 [c] : c [0] : M0 [0-10] : 0 -10 ng/mL [Biopsy with Fusion] : Patient had a biopsy with fusion on [7(3+4)] : Template Biopsy [place] Score: 7(3+4) [] : Patient had a Prostate MRI [5] : 5 [IIB] : IIB [Radiation Therapy] : Radiation Therapy [Treatment with radiation therapy] : Treatment with radiation therapy [LDR] : LDR [BiopsyDate] : 07/22 [MeasuredProstateVolume] : 39 [TotalCores] : 13 [TotalPositiveCores] : 7 [MaxCoreInvolvement] : 95 [EBRT] : No EBRT [HDR] : No HDR [RadiationCompletedDate] : 9/14/22 [LDRDose] : [id number] [LDRFractions] : 1

## 2024-08-26 NOTE — DISEASE MANAGEMENT
[1] : T1 [c] : c [0] : M0 [0-10] : 0 -10 ng/mL [Biopsy with Fusion] : Patient had a biopsy with fusion on [7(3+4)] : Template Biopsy [place] Score: 7(3+4) [] : Patient had a Prostate MRI [5] : 5 [IIB] : IIB [Radiation Therapy] : Radiation Therapy [Treatment with radiation therapy] : Treatment with radiation therapy [LDR] : LDR [BiopsyDate] : 07/22 [MeasuredProstateVolume] : 39 [TotalCores] : 13 [TotalPositiveCores] : 7 [MaxCoreInvolvement] : 95 [EBRT] : No EBRT [HDR] : No HDR [RadiationCompletedDate] : 9/14/22 [LDRDose] : [id number] [LDRFractions] : 1

## 2024-08-26 NOTE — PHYSICAL EXAM
[General Appearance - Well Developed] : well developed [General Appearance - Well Nourished] : well nourished [General Appearance - Alert] : alert [General Appearance - In No Acute Distress] : in no acute distress [Sclera] : the sclera and conjunctiva were normal [Extraocular Movements] : extraocular movements were intact [Outer Ear] : the ears and nose were normal in appearance [Hearing Threshold Finger Rub Not [place]] : hearing was normal [] : no respiratory distress [Respiration, Rhythm And Depth] : normal respiratory rhythm and effort [Exaggerated Use Of Accessory Muscles For Inspiration] : no accessory muscle use [Arterial Pulses Normal] : the arterial pulses were normal [Edema] : no peripheral edema present [Abdomen Soft] : soft [Nondistended] : nondistended [Nail Clubbing] : no clubbing  or cyanosis of the fingernails [Range of Motion to Joints] : range of motion to joints [Motor Tone] : muscle strength and tone were normal [Skin Color & Pigmentation] : normal skin color and pigmentation [No Focal Deficits] : no focal deficits [Oriented To Time, Place, And Person] : oriented to person, place, and time [Affect] : the affect was normal [de-identified] : anxious

## 2024-08-26 NOTE — PHYSICAL EXAM
[General Appearance - Well Developed] : well developed [General Appearance - Well Nourished] : well nourished [General Appearance - Alert] : alert [General Appearance - In No Acute Distress] : in no acute distress [Sclera] : the sclera and conjunctiva were normal [Extraocular Movements] : extraocular movements were intact [Outer Ear] : the ears and nose were normal in appearance [Hearing Threshold Finger Rub Not [place]] : hearing was normal [] : no respiratory distress [Respiration, Rhythm And Depth] : normal respiratory rhythm and effort [Exaggerated Use Of Accessory Muscles For Inspiration] : no accessory muscle use [Arterial Pulses Normal] : the arterial pulses were normal [Edema] : no peripheral edema present [Abdomen Soft] : soft [Nondistended] : nondistended [Nail Clubbing] : no clubbing  or cyanosis of the fingernails [Range of Motion to Joints] : range of motion to joints [Motor Tone] : muscle strength and tone were normal [Skin Color & Pigmentation] : normal skin color and pigmentation [No Focal Deficits] : no focal deficits [Oriented To Time, Place, And Person] : oriented to person, place, and time [Affect] : the affect was normal [de-identified] : anxious

## 2024-08-26 NOTE — HISTORY OF PRESENT ILLNESS
[FreeTextEntry1] : Mr. Storm is a 63 year old man with unfavorable intermediate risk prostate cancer, s/p definitive Pd-103 prostate implant on 9/14/22 under the care of Dr. Allen Weems.   HPI: His PSA increased from 4.55ng/ml on 4/6/2021 to 10.20 on 4/19/22. Since then: 5/26/22: 6.17 6/16/22: 6.39 - He had a multiparametric prostate MRI on 6/10/22 which showed a 4 x 4 x 4.6cm = 39cc gland and a b/l peripheral zone lesion with capsular irregularity and neurovascular abutment without sam NEETA; no SVI or LAD.  - On his biopsy on 7/6/22, 7/13 cores were positive with highest [place] score 3+4=7, the remainder [place] 6.  He takes tadalafil.  - Colonoscopy 9/1/22 showed internal hemorrhoids, 2 small benign polyps.  Baseline IPSS/EPIC Score 6/18  Underwent permanent prostate seed implant on 9/14/22; 125 Gy using Pd-103, 96 seeds via 16 needles.  Hydrogel spacer placed. Had increased urgency and weaker stream post implant; AZO and tamsulosin started.   10/25/22  followup: No major urinary issues as he continues to use AZO 1-2 times daily, reports urinary urgency when he attempted to stop. He continues on Tamsulosin.  He is able to achieve strong erections although he takes Cialis for occasional sexual activity. He does have discomfort from anal fissure/hemorrhoids and is using a steroid cream for this, reports rare blood upon wiping after bowel movement. Denies diarrhea or constipation. IPSS/EPIC Score 10/13  1/31/23:follow up Feels generally well.  symptoms almost back to baseline. Nocturia 1x,  fair to good stream.   Denies dribbling, dysuria, hematuria or bowel symptoms. Tamsulosin 1 tab at night, has missed doses without worsening symptoms. Reports erections only suitable for masturbation with cialis, takes 5 mg intermittently  IPSS/QOL/EPIC score 4/2/10.  - 2/17/23: reported sudden onset watery diarrhea, resolved  5/4/23: follow up Feels generally well.  symptoms back to baseline. Nocturia 1x, good stream.  Rare dribbling with urgency. Doing kegels but not consistently. Denies dysuria, hematuria or bowel symptoms. Tamsulosin 1 tab at night. Reports erections only suitable for masturbation with cialis, takes 5 mg daily IPSS/QOL/EPIC score 1/2/7.   8/22/23: Followup  Doing well overall. Bowel function normal. Erectile function (did not have adt) on cialis 5mg, states he is dissatisfied with function for 20 years but able to masturbate, although not a major issue for him. Urinary function is good on flomax qpm, nocturia x 1. Doing kegels.  IPSS/QOL/EPIC: 5/2/15  PSA trend: iPSA 5/26/22: 6.17 6/16/22: 6.39 -> prostate seed implant on 9/14/22 1/14/23: 0.99 4/25/23: 1.01 8/7/23: 0.84 2/5/24: 0.32  8/6/2024- .79  2/13/24 Followup.  Patient reports nocturia 2-3 times every night. He denies any burning, no urgency, no frequency. He also reports occasional dribbling and denies constipation. Some gas and bloating; using metamucil. He reports he has erections sufficient for masturbation, but don't last; he is not sexually active with a partner. He continues to take Flomax 0.4mg nightly, and Tadalafil 5mg daily.  IPSS/QOL/EPIC:  7/2/16 8/22/2024- Mr. Storm presents today for follow up. His PSA is slightly increased at 0.79. Follows with Dr. Terry, continues on tadalafil. Symptoms mostly under control, IPSS 6, EPIC 13, QOL2. Notes mild dribbling, frequency, incomplete emptying, intermittency, urgency, weak stream, and nocturia.

## (undated) DEVICE — BASIN SET DOUBLE

## (undated) DEVICE — GOWN XL

## (undated) DEVICE — WARMING BLANKET UPPER ADULT

## (undated) DEVICE — VENODYNE/SCD SLEEVE CALF MEDIUM

## (undated) DEVICE — PREP BETADINE SPONGE STICKS

## (undated) DEVICE — BALLOON ENDOCAVITY 2X14CM

## (undated) DEVICE — Device

## (undated) DEVICE — SYR LUER LOK 50CC

## (undated) DEVICE — DRAPE C ARM BAND BAG

## (undated) DEVICE — SOL IRR BAG H2O 3000ML

## (undated) DEVICE — DRAPE BACK TABLE COVER 44X90"

## (undated) DEVICE — GRID BRACHYTHERAPY EZ 18G

## (undated) DEVICE — STOPCOCK 3 WAY

## (undated) DEVICE — SYR LUER LOK 10CC

## (undated) DEVICE — FOLEY CATH 2-WAY 16FR 5CC LATEX LUBRICATH

## (undated) DEVICE — GLV 7.5 PROTEXIS (WHITE)